# Patient Record
Sex: FEMALE | Race: WHITE | NOT HISPANIC OR LATINO
[De-identification: names, ages, dates, MRNs, and addresses within clinical notes are randomized per-mention and may not be internally consistent; named-entity substitution may affect disease eponyms.]

---

## 2017-05-22 ENCOUNTER — APPOINTMENT (OUTPATIENT)
Dept: GASTROENTEROLOGY | Facility: CLINIC | Age: 42
End: 2017-05-22

## 2017-05-22 VITALS
WEIGHT: 182 LBS | HEIGHT: 61 IN | SYSTOLIC BLOOD PRESSURE: 100 MMHG | DIASTOLIC BLOOD PRESSURE: 80 MMHG | BODY MASS INDEX: 34.36 KG/M2 | TEMPERATURE: 97.8 F

## 2017-05-22 DIAGNOSIS — Z87.891 PERSONAL HISTORY OF NICOTINE DEPENDENCE: ICD-10-CM

## 2017-05-22 DIAGNOSIS — K62.5 HEMORRHAGE OF ANUS AND RECTUM: ICD-10-CM

## 2017-05-22 DIAGNOSIS — Z82.49 FAMILY HISTORY OF ISCHEMIC HEART DISEASE AND OTHER DISEASES OF THE CIRCULATORY SYSTEM: ICD-10-CM

## 2017-05-22 DIAGNOSIS — Z23 ENCOUNTER FOR IMMUNIZATION: ICD-10-CM

## 2017-05-22 DIAGNOSIS — R63.5 ABNORMAL WEIGHT GAIN: ICD-10-CM

## 2017-05-22 DIAGNOSIS — F15.90 OTHER STIMULANT USE, UNSPECIFIED, UNCOMPLICATED: ICD-10-CM

## 2017-05-22 DIAGNOSIS — Z87.19 PERSONAL HISTORY OF OTHER DISEASES OF THE DIGESTIVE SYSTEM: ICD-10-CM

## 2017-05-22 PROBLEM — Z00.00 ENCOUNTER FOR PREVENTIVE HEALTH EXAMINATION: Status: ACTIVE | Noted: 2017-05-22

## 2017-05-22 RX ORDER — ASCORBIC ACID 500 MG
TABLET ORAL
Refills: 0 | Status: ACTIVE | COMMUNITY

## 2017-06-13 ENCOUNTER — RESULT REVIEW (OUTPATIENT)
Age: 42
End: 2017-06-13

## 2017-06-13 ENCOUNTER — OUTPATIENT (OUTPATIENT)
Dept: OUTPATIENT SERVICES | Facility: HOSPITAL | Age: 42
LOS: 1 days | Discharge: HOME | End: 2017-06-13
Payer: MEDICAID

## 2017-06-13 ENCOUNTER — APPOINTMENT (OUTPATIENT)
Dept: GASTROENTEROLOGY | Facility: HOSPITAL | Age: 42
End: 2017-06-13

## 2017-06-13 VITALS
HEART RATE: 50 BPM | OXYGEN SATURATION: 100 % | SYSTOLIC BLOOD PRESSURE: 109 MMHG | RESPIRATION RATE: 14 BRPM | DIASTOLIC BLOOD PRESSURE: 60 MMHG

## 2017-06-13 VITALS
OXYGEN SATURATION: 99 % | TEMPERATURE: 98 F | HEIGHT: 61 IN | DIASTOLIC BLOOD PRESSURE: 54 MMHG | HEART RATE: 58 BPM | WEIGHT: 184.97 LBS | RESPIRATION RATE: 16 BRPM | SYSTOLIC BLOOD PRESSURE: 104 MMHG

## 2017-06-13 DIAGNOSIS — K58.9 IRRITABLE BOWEL SYNDROME WITHOUT DIARRHEA: ICD-10-CM

## 2017-06-13 DIAGNOSIS — K21.9 GASTRO-ESOPHAGEAL REFLUX DISEASE WITHOUT ESOPHAGITIS: ICD-10-CM

## 2017-06-13 DIAGNOSIS — K59.00 CONSTIPATION, UNSPECIFIED: ICD-10-CM

## 2017-06-13 LAB — HCG UR QL: NEGATIVE — SIGNIFICANT CHANGE UP

## 2017-06-13 PROCEDURE — 88305 TISSUE EXAM BY PATHOLOGIST: CPT | Mod: 26

## 2017-06-13 PROCEDURE — 88312 SPECIAL STAINS GROUP 1: CPT | Mod: 26

## 2017-06-13 RX ORDER — SODIUM CHLORIDE 9 MG/ML
1000 INJECTION, SOLUTION INTRAVENOUS
Qty: 0 | Refills: 0 | Status: DISCONTINUED | OUTPATIENT
Start: 2017-06-13 | End: 2017-06-13

## 2017-06-13 NOTE — ASU DISCHARGE PLAN (ADULT/PEDIATRIC). - NOTIFY
Bleeding that does not stop/Persistent Nausea and Vomiting/Inability to Tolerate Liquids or Foods/Fever greater than 101

## 2017-06-15 LAB — SURGICAL PATHOLOGY FINAL REPORT - CH: SIGNIFICANT CHANGE UP

## 2017-06-16 DIAGNOSIS — B96.81 HELICOBACTER PYLORI [H. PYLORI] AS THE CAUSE OF DISEASES CLASSIFIED ELSEWHERE: ICD-10-CM

## 2017-06-16 DIAGNOSIS — K29.50 UNSPECIFIED CHRONIC GASTRITIS WITHOUT BLEEDING: ICD-10-CM

## 2017-06-16 DIAGNOSIS — R10.9 UNSPECIFIED ABDOMINAL PAIN: ICD-10-CM

## 2017-06-16 DIAGNOSIS — K64.9 UNSPECIFIED HEMORRHOIDS: ICD-10-CM

## 2017-06-18 ENCOUNTER — MOBILE ON CALL (OUTPATIENT)
Age: 42
End: 2017-06-18

## 2017-06-20 ENCOUNTER — APPOINTMENT (OUTPATIENT)
Dept: GASTROENTEROLOGY | Facility: CLINIC | Age: 42
End: 2017-06-20

## 2017-06-29 ENCOUNTER — APPOINTMENT (OUTPATIENT)
Dept: GASTROENTEROLOGY | Facility: CLINIC | Age: 42
End: 2017-06-29

## 2017-06-29 VITALS
DIASTOLIC BLOOD PRESSURE: 60 MMHG | SYSTOLIC BLOOD PRESSURE: 100 MMHG | TEMPERATURE: 97.9 F | WEIGHT: 181 LBS | HEIGHT: 61 IN | BODY MASS INDEX: 34.17 KG/M2

## 2017-06-29 DIAGNOSIS — Z71.89 OTHER SPECIFIED COUNSELING: ICD-10-CM

## 2017-06-29 DIAGNOSIS — R14.0 ABDOMINAL DISTENSION (GASEOUS): ICD-10-CM

## 2017-06-29 DIAGNOSIS — E66.9 OBESITY, UNSPECIFIED: ICD-10-CM

## 2017-06-29 DIAGNOSIS — R10.13 EPIGASTRIC PAIN: ICD-10-CM

## 2017-06-29 RX ORDER — SODIUM SULFATE, POTASSIUM SULFATE, MAGNESIUM SULFATE 17.5; 3.13; 1.6 G/ML; G/ML; G/ML
17.5-3.13-1.6 SOLUTION, CONCENTRATE ORAL
Qty: 1 | Refills: 0 | Status: DISCONTINUED | COMMUNITY
Start: 2017-06-29 | End: 2017-06-29

## 2017-07-07 ENCOUNTER — OUTPATIENT (OUTPATIENT)
Dept: OUTPATIENT SERVICES | Facility: HOSPITAL | Age: 42
LOS: 1 days | Discharge: HOME | End: 2017-07-07

## 2017-07-07 ENCOUNTER — APPOINTMENT (OUTPATIENT)
Dept: GASTROENTEROLOGY | Facility: HOSPITAL | Age: 42
End: 2017-07-07

## 2017-07-07 VITALS
HEART RATE: 54 BPM | DIASTOLIC BLOOD PRESSURE: 56 MMHG | HEIGHT: 61 IN | OXYGEN SATURATION: 97 % | SYSTOLIC BLOOD PRESSURE: 116 MMHG | RESPIRATION RATE: 16 BRPM | TEMPERATURE: 98 F | WEIGHT: 182.1 LBS

## 2017-07-07 VITALS
DIASTOLIC BLOOD PRESSURE: 63 MMHG | HEART RATE: 85 BPM | OXYGEN SATURATION: 99 % | RESPIRATION RATE: 13 BRPM | SYSTOLIC BLOOD PRESSURE: 93 MMHG

## 2017-07-07 DIAGNOSIS — Z98.891 HISTORY OF UTERINE SCAR FROM PREVIOUS SURGERY: Chronic | ICD-10-CM

## 2017-07-07 LAB — HCG UR QL: NEGATIVE — SIGNIFICANT CHANGE UP

## 2017-07-07 RX ORDER — SODIUM CHLORIDE 9 MG/ML
1000 INJECTION, SOLUTION INTRAVENOUS
Qty: 0 | Refills: 0 | Status: DISCONTINUED | OUTPATIENT
Start: 2017-07-07 | End: 2017-07-07

## 2017-07-07 RX ORDER — OMEPRAZOLE 10 MG/1
1 CAPSULE, DELAYED RELEASE ORAL
Qty: 0 | Refills: 0 | COMMUNITY

## 2017-07-07 RX ORDER — MORPHINE SULFATE 50 MG/1
4 CAPSULE, EXTENDED RELEASE ORAL
Qty: 0 | Refills: 0 | Status: DISCONTINUED | OUTPATIENT
Start: 2017-07-07 | End: 2017-07-22

## 2017-07-07 RX ADMIN — Medication 1 MILLIGRAM(S): at 10:30

## 2017-07-07 RX ADMIN — SODIUM CHLORIDE 75 MILLILITER(S): 9 INJECTION, SOLUTION INTRAVENOUS at 11:00

## 2017-07-10 ENCOUNTER — APPOINTMENT (OUTPATIENT)
Dept: GASTROENTEROLOGY | Facility: CLINIC | Age: 42
End: 2017-07-10

## 2017-07-10 VITALS
HEIGHT: 61 IN | TEMPERATURE: 97.8 F | BODY MASS INDEX: 34.74 KG/M2 | DIASTOLIC BLOOD PRESSURE: 65 MMHG | WEIGHT: 184 LBS | SYSTOLIC BLOOD PRESSURE: 100 MMHG

## 2017-07-10 DIAGNOSIS — K59.00 CONSTIPATION, UNSPECIFIED: ICD-10-CM

## 2017-07-10 DIAGNOSIS — K21.9 GASTRO-ESOPHAGEAL REFLUX DISEASE W/OUT ESOPHAGITIS: ICD-10-CM

## 2017-07-10 DIAGNOSIS — A04.8 OTHER SPECIFIED BACTERIAL INTESTINAL INFECTIONS: ICD-10-CM

## 2017-07-10 DIAGNOSIS — K58.9 IRRITABLE BOWEL SYNDROME W/OUT DIARRHEA: ICD-10-CM

## 2017-07-10 RX ORDER — LINACLOTIDE 145 UG/1
145 CAPSULE, GELATIN COATED ORAL
Qty: 30 | Refills: 3 | Status: ACTIVE | COMMUNITY
Start: 2017-07-10 | End: 1900-01-01

## 2017-07-10 RX ORDER — OMEPRAZOLE 20 MG/1
20 CAPSULE, DELAYED RELEASE ORAL
Qty: 20 | Refills: 1 | Status: ACTIVE | COMMUNITY
Start: 2017-05-22 | End: 1900-01-01

## 2017-07-10 RX ORDER — AMOXICILLIN 500 MG/1
500 CAPSULE ORAL TWICE DAILY
Qty: 40 | Refills: 0 | Status: ACTIVE | COMMUNITY
Start: 2017-07-10 | End: 1900-01-01

## 2017-07-10 RX ORDER — CLARITHROMYCIN 500 MG/1
500 TABLET, FILM COATED ORAL TWICE DAILY
Qty: 20 | Refills: 1 | Status: ACTIVE | COMMUNITY
Start: 2017-07-10 | End: 1900-01-01

## 2017-07-11 DIAGNOSIS — R19.4 CHANGE IN BOWEL HABIT: ICD-10-CM

## 2017-07-11 DIAGNOSIS — K64.9 UNSPECIFIED HEMORRHOIDS: ICD-10-CM

## 2017-09-11 ENCOUNTER — APPOINTMENT (OUTPATIENT)
Dept: GASTROENTEROLOGY | Facility: CLINIC | Age: 42
End: 2017-09-11

## 2017-09-14 ENCOUNTER — APPOINTMENT (OUTPATIENT)
Dept: GASTROENTEROLOGY | Facility: CLINIC | Age: 42
End: 2017-09-14

## 2017-09-25 ENCOUNTER — APPOINTMENT (OUTPATIENT)
Dept: GASTROENTEROLOGY | Facility: CLINIC | Age: 42
End: 2017-09-25
Payer: MEDICAID

## 2017-09-25 PROCEDURE — 83014 H PYLORI DRUG ADMIN: CPT

## 2024-12-05 ENCOUNTER — INPATIENT (INPATIENT)
Facility: HOSPITAL | Age: 49
LOS: 0 days | Discharge: HOME | End: 2024-12-06
Attending: GENERAL PRACTICE | Admitting: GENERAL PRACTICE
Payer: MEDICAID

## 2024-12-05 VITALS
HEART RATE: 55 BPM | RESPIRATION RATE: 17 BRPM | SYSTOLIC BLOOD PRESSURE: 99 MMHG | TEMPERATURE: 98 F | OXYGEN SATURATION: 100 % | DIASTOLIC BLOOD PRESSURE: 65 MMHG

## 2024-12-05 DIAGNOSIS — Z29.9 ENCOUNTER FOR PROPHYLACTIC MEASURES, UNSPECIFIED: ICD-10-CM

## 2024-12-05 DIAGNOSIS — I25.10 ATHEROSCLEROTIC HEART DISEASE OF NATIVE CORONARY ARTERY WITHOUT ANGINA PECTORIS: ICD-10-CM

## 2024-12-05 DIAGNOSIS — Z98.891 HISTORY OF UTERINE SCAR FROM PREVIOUS SURGERY: Chronic | ICD-10-CM

## 2024-12-05 DIAGNOSIS — E78.5 HYPERLIPIDEMIA, UNSPECIFIED: ICD-10-CM

## 2024-12-05 DIAGNOSIS — R07.9 CHEST PAIN, UNSPECIFIED: ICD-10-CM

## 2024-12-05 PROBLEM — K59.00 CONSTIPATION, UNSPECIFIED: Chronic | Status: ACTIVE | Noted: 2017-06-13

## 2024-12-05 PROBLEM — K21.9 GASTRO-ESOPHAGEAL REFLUX DISEASE WITHOUT ESOPHAGITIS: Chronic | Status: ACTIVE | Noted: 2017-07-07

## 2024-12-05 LAB
ALBUMIN SERPL ELPH-MCNC: 4.1 G/DL — SIGNIFICANT CHANGE UP (ref 3.3–5)
ALP SERPL-CCNC: 50 U/L — SIGNIFICANT CHANGE UP (ref 40–120)
ALT FLD-CCNC: 10 U/L — SIGNIFICANT CHANGE UP (ref 4–33)
ANION GAP SERPL CALC-SCNC: 10 MMOL/L — SIGNIFICANT CHANGE UP (ref 7–14)
AST SERPL-CCNC: 14 U/L — SIGNIFICANT CHANGE UP (ref 4–32)
BASOPHILS # BLD AUTO: 0.03 K/UL — SIGNIFICANT CHANGE UP (ref 0–0.2)
BASOPHILS NFR BLD AUTO: 0.4 % — SIGNIFICANT CHANGE UP (ref 0–2)
BILIRUB SERPL-MCNC: 0.3 MG/DL — SIGNIFICANT CHANGE UP (ref 0.2–1.2)
BUN SERPL-MCNC: 12 MG/DL — SIGNIFICANT CHANGE UP (ref 7–23)
CALCIUM SERPL-MCNC: 9 MG/DL — SIGNIFICANT CHANGE UP (ref 8.4–10.5)
CHLORIDE SERPL-SCNC: 106 MMOL/L — SIGNIFICANT CHANGE UP (ref 98–107)
CO2 SERPL-SCNC: 25 MMOL/L — SIGNIFICANT CHANGE UP (ref 22–31)
CREAT SERPL-MCNC: 0.71 MG/DL — SIGNIFICANT CHANGE UP (ref 0.5–1.3)
CRP SERPL-MCNC: <3 MG/L — SIGNIFICANT CHANGE UP
D DIMER BLD IA.RAPID-MCNC: <150 NG/ML DDU — SIGNIFICANT CHANGE UP
EGFR: 104 ML/MIN/1.73M2 — SIGNIFICANT CHANGE UP
EOSINOPHIL # BLD AUTO: 0.07 K/UL — SIGNIFICANT CHANGE UP (ref 0–0.5)
EOSINOPHIL NFR BLD AUTO: 0.9 % — SIGNIFICANT CHANGE UP (ref 0–6)
ERYTHROCYTE [SEDIMENTATION RATE] IN BLOOD: 6 MM/HR — SIGNIFICANT CHANGE UP (ref 4–25)
FLUAV AG NPH QL: SIGNIFICANT CHANGE UP
FLUBV AG NPH QL: SIGNIFICANT CHANGE UP
GLUCOSE SERPL-MCNC: 84 MG/DL — SIGNIFICANT CHANGE UP (ref 70–99)
HCT VFR BLD CALC: 38.7 % — SIGNIFICANT CHANGE UP (ref 34.5–45)
HGB BLD-MCNC: 13 G/DL — SIGNIFICANT CHANGE UP (ref 11.5–15.5)
IANC: 4.23 K/UL — SIGNIFICANT CHANGE UP (ref 1.8–7.4)
IMM GRANULOCYTES NFR BLD AUTO: 0.3 % — SIGNIFICANT CHANGE UP (ref 0–0.9)
LIDOCAIN IGE QN: 24 U/L — SIGNIFICANT CHANGE UP (ref 7–60)
LYMPHOCYTES # BLD AUTO: 2.75 K/UL — SIGNIFICANT CHANGE UP (ref 1–3.3)
LYMPHOCYTES # BLD AUTO: 37 % — SIGNIFICANT CHANGE UP (ref 13–44)
MAGNESIUM SERPL-MCNC: 1.9 MG/DL — SIGNIFICANT CHANGE UP (ref 1.6–2.6)
MCHC RBC-ENTMCNC: 30.4 PG — SIGNIFICANT CHANGE UP (ref 27–34)
MCHC RBC-ENTMCNC: 33.6 G/DL — SIGNIFICANT CHANGE UP (ref 32–36)
MCV RBC AUTO: 90.4 FL — SIGNIFICANT CHANGE UP (ref 80–100)
MONOCYTES # BLD AUTO: 0.33 K/UL — SIGNIFICANT CHANGE UP (ref 0–0.9)
MONOCYTES NFR BLD AUTO: 4.4 % — SIGNIFICANT CHANGE UP (ref 2–14)
NEUTROPHILS # BLD AUTO: 4.23 K/UL — SIGNIFICANT CHANGE UP (ref 1.8–7.4)
NEUTROPHILS NFR BLD AUTO: 57 % — SIGNIFICANT CHANGE UP (ref 43–77)
NRBC # BLD: 0 /100 WBCS — SIGNIFICANT CHANGE UP (ref 0–0)
NRBC # FLD: 0 K/UL — SIGNIFICANT CHANGE UP (ref 0–0)
NT-PROBNP SERPL-SCNC: 44 PG/ML — SIGNIFICANT CHANGE UP
PLATELET # BLD AUTO: 254 K/UL — SIGNIFICANT CHANGE UP (ref 150–400)
POTASSIUM SERPL-MCNC: 4.3 MMOL/L — SIGNIFICANT CHANGE UP (ref 3.5–5.3)
POTASSIUM SERPL-SCNC: 4.3 MMOL/L — SIGNIFICANT CHANGE UP (ref 3.5–5.3)
PROT SERPL-MCNC: 6.5 G/DL — SIGNIFICANT CHANGE UP (ref 6–8.3)
RBC # BLD: 4.28 M/UL — SIGNIFICANT CHANGE UP (ref 3.8–5.2)
RBC # FLD: 12.8 % — SIGNIFICANT CHANGE UP (ref 10.3–14.5)
RSV RNA NPH QL NAA+NON-PROBE: SIGNIFICANT CHANGE UP
SARS-COV-2 RNA SPEC QL NAA+PROBE: SIGNIFICANT CHANGE UP
SODIUM SERPL-SCNC: 141 MMOL/L — SIGNIFICANT CHANGE UP (ref 135–145)
TROPONIN T, HIGH SENSITIVITY RESULT: <6 NG/L — SIGNIFICANT CHANGE UP
WBC # BLD: 7.43 K/UL — SIGNIFICANT CHANGE UP (ref 3.8–10.5)
WBC # FLD AUTO: 7.43 K/UL — SIGNIFICANT CHANGE UP (ref 3.8–10.5)

## 2024-12-05 PROCEDURE — 71045 X-RAY EXAM CHEST 1 VIEW: CPT | Mod: 26

## 2024-12-05 PROCEDURE — 99285 EMERGENCY DEPT VISIT HI MDM: CPT

## 2024-12-05 PROCEDURE — 99497 ADVNCD CARE PLAN 30 MIN: CPT | Mod: 25

## 2024-12-05 PROCEDURE — 99233 SBSQ HOSP IP/OBS HIGH 50: CPT | Mod: 25

## 2024-12-05 RX ORDER — MAGNESIUM, ALUMINUM HYDROXIDE 200-225/5
30 SUSPENSION, ORAL (FINAL DOSE FORM) ORAL ONCE
Refills: 0 | Status: COMPLETED | OUTPATIENT
Start: 2024-12-05 | End: 2024-12-05

## 2024-12-05 RX ORDER — ACETAMINOPHEN 500MG 500 MG/1
650 TABLET, COATED ORAL EVERY 6 HOURS
Refills: 0 | Status: DISCONTINUED | OUTPATIENT
Start: 2024-12-05 | End: 2024-12-06

## 2024-12-05 RX ORDER — ENOXAPARIN SODIUM 30 MG/.3ML
40 INJECTION SUBCUTANEOUS EVERY 24 HOURS
Refills: 0 | Status: DISCONTINUED | OUTPATIENT
Start: 2024-12-05 | End: 2024-12-06

## 2024-12-05 RX ORDER — CHOLECALCIFEROL (VITAMIN D3) 10MCG/0.25
2000 DROPS ORAL
Refills: 0 | DISCHARGE

## 2024-12-05 RX ADMIN — Medication 20 MILLIGRAM(S): at 23:52

## 2024-12-05 RX ADMIN — Medication 324 MILLIGRAM(S): at 19:30

## 2024-12-05 NOTE — ED PROVIDER NOTE - CLINICAL SUMMARY MEDICAL DECISION MAKING FREE TEXT BOX
History:  - 49 years old f pmhx GERD, CAD, p/w episode of Chest tightness, dizziness, shortness of breath starting 1 hour prior to arrival, Recent viral illness with ear infection 2 weeks ago affecting chest. Similar episode 2 days ago  Associated symptoms: coughing, nausea No vomiting, fevers. Recent stress test completed, cardiologist Dr. Freire recommended coronary CT (declined by insurance)    Past Medical History:  - CAD  - GERD  - Constipation  - Previous   - Last seen at [facility] in 2017    Medications:  - Vitamin D  - Vitamin B12  - Omeprazole (as of 2017)    Physical Examination:  - BP: 99/65  - HR: 65  - RR: 18  - Temp: afebrile  - O2: 100% on room air  - Heart: normal, no murmurs, rubs, or gallops no friction rub  - Lungs: clear to auscultation bilaterally  - Abdomen: soft, nontender  - Extremities: no peripheral oedema  - Skin: warm, well perfused    Impression:  - Acute coronary symptoms for investigation vs pericarditis vs coronary spasm    Management Plan:  - Blood work including esr/crp given recent infection pericarditis will get bedside US eval for pericardial effusion although did not hear friction rub, vs coronary spasm  - Chest X-ray  - CDU for coronary CT  - Contact cardiologist (Dr Nate Freire)

## 2024-12-05 NOTE — H&P ADULT - NSICDXPASTMEDICALHX_GEN_ALL_CORE_FT
PAST MEDICAL HISTORY:  CAD (coronary artery disease)     Constipation     Gastroesophageal reflux disease

## 2024-12-05 NOTE — H&P ADULT - NSHPPHYSICALEXAM_GEN_ALL_CORE
- GENERAL: Alert and oriented x 3. No acute distress. Well-nourished.  - EYES: EOMI. Anicteric.  - HENT: Moist mucous membranes. No scleral icterus. No cervical lymphadenopathy.  - LUNGS: Clear to auscultation bilaterally. No accessory muscle use.  - CARDIOVASCULAR: Regular rate and rhythm. No murmur. No JVD. TTP of the chest wall  - ABDOMEN: Soft, non-tender and non-distended. No palpable masses.  - EXTREMITIES: No edema. Non-tender.  - SKIN: No rashes or lesions. Warm.  - NEUROLOGIC: No focal neurological deficits. CN II-XII grossly intact, but not individually tested.  - PSYCHIATRIC: Cooperative. Appropriate mood and affect.

## 2024-12-05 NOTE — H&P ADULT - TIME BILLING
Preparing to see the patient including review of tests and other providers' notes, confirming history with patient, performing medical examination and evaluation, counseling and educating the patient, ordering medications, tests and procedures, communicating with other health care professionals, documenting clinical information in the EMR, independently interpreting results and communicating results to the patient/family/caregiver, care coordination

## 2024-12-05 NOTE — ED PROVIDER NOTE - PROGRESS NOTE DETAILS
spoke with dr mike, since no possibility of ct coronory until next wed, jacqueline dmit to dr martinez for cath

## 2024-12-05 NOTE — PATIENT PROFILE ADULT - NSFALLSECTIONLABEL_GEN_A_CORE
WORKERS' COMPENSATION FOLLOW-UP NOTE    EMPLOYER: [employer]    DATE OF INJURY: 2017    CHIEF COMPLAINT:    Chief Complaint   Patient presents with   • Worker's Compensation     WRK 17 BACK L [employer]       HISTORY OF INJURY:   Nidia Ortiz is a 43 year old female, who returns for follow up of a work injury to her  BACK  that occurred at work on 2017.      Compared to the worst this pain has been since the time of initial injury, patient reports currently feeling ***% better.    Current medications were reviewed.  Medications relevant to this injury as actually taken at this time by patient, (including dose, frequency) are: ***.    Currently employee states she is {RES WORKIN}.    Is your employer following the restrictions you were given?  {Yes_No_---:114448}    Therapy:  {RES ATTENDANCE:090885}.    The specific location of pain or other symptoms  ***    REVIEW OF SYSTEMS: Relevant findings are included in the History of Injury.    PHYSICAL EXAM:   There were no vitals filed for this visit.  ***     ASSESSMENT:  No diagnosis found.    Work Relatedness:  Based on patient's history and my evaluation, this injury/illness is {OSW Work Related:784365} by causation or by precipitation or aggravation.      PLAN:    Treatment/Medication changes:  Continue present management ***.    During a *** minute visit, more than half of the visit was spent counseling the patient.       .

## 2024-12-05 NOTE — H&P ADULT - PROBLEM SELECTOR PLAN 2
-Follows with Dr. Freire; was recommended obtaining coronary CTA but has not been able to get it due to insurance concerns  -Ordered Coronary CTA (Obtain inpatient if able vs follow up outpatient)

## 2024-12-05 NOTE — H&P ADULT - PROBLEM SELECTOR PLAN 4
VTE PPx: Lovenox  Nutrition: DASH/TLC Diet  Fluids: None  Electrolytes: Maintain K>4, Mag >2, Phos > 3  Access: PIV  Code Status: FULL  Dispo: pending clinical improvement

## 2024-12-05 NOTE — ED ADULT NURSE NOTE - NSFALLUNIVINTERV_ED_ALL_ED
Bed/Stretcher in lowest position, wheels locked, appropriate side rails in place/Call bell, personal items and telephone in reach/Instruct patient to call for assistance before getting out of bed/chair/stretcher/Non-slip footwear applied when patient is off stretcher/[place] to call system/Physically safe environment - no spills, clutter or unnecessary equipment/Purposeful proactive rounding/Room/bathroom lighting operational, light cord in reach

## 2024-12-05 NOTE — ED ADULT TRIAGE NOTE - CHIEF COMPLAINT QUOTE
Pt c/o cp starting 1 hour ago. Pt was seen at urgent care and sent to ED for further evaluation. Phx " Valve blockage"

## 2024-12-05 NOTE — ED PROVIDER NOTE - PHYSICAL EXAMINATION
GENERAL: well appearing in no acute distress, non-toxic appearing  CARDIAC: regular rate and rhythm, normal S1S2, no appreciable murmurs, 2+ pulses in UE/LE b/l  PULM: normal breath sounds, clear to ascultation bilaterally, no rales, rhonchi, wheezing  GI: abdomen nondistended, soft, nontender, no guarding, rebound tenderness  NEURO: no focal motor or sensory deficits, CN2-12 intact, normal speech, PERRLA, EOMI, normal gait, AAOx3  MSK: no peripheral edema, no calf tenderness b/l

## 2024-12-05 NOTE — ED ADULT NURSE NOTE - OBJECTIVE STATEMENT
pt received to 1A, A&Ox4, ambulatory, hx of "valve blockage", coming to ED c/o intermittent left sided chest pressure with SOB x1 month. Pt states these episodes last 20 minutes and resolve by themselves. last episode 12:30 pm today. Pt denies chest pain, SOB, abdominal pain, N/V, headache, dizziness, blurry vision, fever or chills at this time. RR even and unlabored, spo2 100% on room air. Placed on cardiac monitor, noted to be NSR. EKG complete. +2 radial pulses bilaterally, equal in strength rate and rhythm. Abdomen soft non-tender, nondistended. No neuro deficits noted. Skin clean dry and intact. 20 G IV placed to left AC. labs drawn and sent. care plan continued. safety maintained.

## 2024-12-05 NOTE — H&P ADULT - HISTORY OF PRESENT ILLNESS
49 year old female with PMHx of CAD, HLD and GERD who presents to the ED with chest tightness. It is located in the substernal region, not provoked by exertion, and not relieved by rest or nitroglycerin. It is characterized as "tightness" and is non-radiating. Denies any associated symptoms including SOB, fatigue, distress, diaphoresis, nausea, vomiting, abdominal pain, or leg swelling. Patient recently had a URI about 2 weeks ago and had coughing and nausea at that time. Patient denies immobility, long plane/car rides, malignancy, or history of clotting disorder. No family history of CVD before the age of 65. Patient currently smokes 4-5 cigarettes per day. Patient follows with cardiology (Dr. Freire) and had a normal stress test recently. Patient was planning on obtaining coronary CTA but has not been able to get it due to insurance concerns. No prior history of MI/PCI/CABG/CVA/TIA/PAD/AAA. No hx of hypothyroidism or HIV infection or Hepatitis C or autoimmune disorders. No psychosocial factors including stress/anxiety/depression.

## 2024-12-05 NOTE — H&P ADULT - PROBLEM SELECTOR PLAN 1
::Nonspecific chest pain without troponinemia; ECG with no ischemic changes; Hemodynamically stable. No evidence of volume overload or shock on exam. Low suspicion ACS given positional, and reproducible pain by palpation. Low suspicion for PE (Wells score 0), PTX, aortic dissection, PNA, cardiac effusion or tamponade. CP likely 2/2 costochondritis. Other differentials include GERD.   -HEART score: 3 ;CAD risk factors include Age 49; HLD, Smoking  -Troponin: <6  -CXR: no acute process  -CBC, CMP unremarkable  -pro-BNP 44  -Pre-test probability of CAD 3% per CAD consortium  -Admit to Telemetry; cardiac monitor  -ASA & Statin   -Repeat ECG in the AM  -TSH, A1c, LDL to risk stratify or assess for CV risk factors   -Avoid NSAIDs except for ASA to limit risk of cardiovascular events  -F/u pending labs and images

## 2024-12-05 NOTE — ED ADULT TRIAGE NOTE - NS ED TRIAGE AVPU SCALE
Detail Level: Detailed Size Of Lesion: 2-4mm Body Location Override (Optional - Billing Will Still Be Based On Selected Body Map Location If Applicable): right upper posterior leg Size Of Lesion: 6mm Alert-The patient is alert, awake and responds to voice. The patient is oriented to time, place, and person. The triage nurse is able to obtain subjective information.

## 2024-12-05 NOTE — H&P ADULT - NSHPREVIEWOFSYSTEMS_GEN_ALL_CORE
- CONSTITUTIONAL: Denies weight loss, fever and chills.  - HEENT: Denies changes in vision and hearing.  - RESPIRATORY: Denies SOB and cough.  - CV: Denies palpitations. +CP  - GI: Denies abdominal pain, nausea, vomiting and diarrhea.  - : Denies dysuria and urinary frequency.  - MSK: Denies myalgia and joint pain.  - SKIN: Denies rash and pruritus.  - NEUROLOGICAL: Denies headache and syncope.  - PSYCHIATRIC: Denies recent changes in mood. Denies anxiety and depression.    A 12-point review of systems was completed and is otherwise negative except as noted in the HPI.

## 2024-12-05 NOTE — PATIENT PROFILE ADULT - FALL HARM RISK - HARM RISK INTERVENTIONS
Communicate Risk of Fall with Harm to all staff/Monitor for mental status changes/Monitor gait and stability/Reinforce activity limits and safety measures with patient and family/Reorient to person, place and time as needed/Review medications for side effects contributing to fall risk/Sit up slowly, dangle for a short time, stand at bedside before walking/Tailored Fall Risk Interventions/Toileting schedule using arm’s reach rule for commode and bathroom/Use of alarms - bed, chair and/or voice tab/Visual Cue: Yellow wristband and red socks/Bed in lowest position, wheels locked, appropriate side rails in place/Call bell, personal items and telephone in reach/Instruct patient to call for assistance before getting out of bed or chair/Non-slip footwear when patient is out of bed/[place] to call system/Physically safe environment - no spills, clutter or unnecessary equipment/Purposeful Proactive Rounding/Room/bathroom lighting operational, light cord in reach

## 2024-12-05 NOTE — ED PROVIDER NOTE - NS ED MD DISPO DIVISION
RUSSELL Tazorac Counseling:  Patient advised that medication is irritating and drying.  Patient may need to apply sparingly and wash off after an hour before eventually leaving it on overnight.  The patient verbalized understanding of the proper use and possible adverse effects of tazorac.  All of the patient's questions and concerns were addressed.

## 2024-12-05 NOTE — H&P ADULT - ASSESSMENT
49 year old female with PMHx of CAD, GERD, HLD who presents to the ED with chest tightness. Exam notable for TTP of the chest wall. Troponin negative. ECG with sinus bradycardia. Pain likely 2/2 costochondritis. Given cardiac hx patient admitted for observation.

## 2024-12-05 NOTE — ED PROVIDER NOTE - ATTENDING CONTRIBUTION TO CARE
This is a 49-year-old female with a past medical history of GERD, CAD presents to ED complaining of chest pain dizziness shortness of breath that has been intermittent for the past 2 weeks.  She said the symptoms started again about an hour ago and she is currently asymptomatic.  She had a recent viral illness about 2 weeks ago.  Denies any cough, fevers, chills.  She states she had a recent stress test as negative by cardiology and they recommended a CT of the coronaries that she has not had yet.    Physical exam:  Overall well-appearing female in no acute distress.  Vital signs are stable.  Heart is regular rate without murmurs or gallops  Lungs are clear to auscultation lung field appears rales or rhonchi  Abdomen soft nontender distended  No lower extremity swelling    MDM  Patient presents to the ED complaining of chest pain shortness of breath and dizziness.  She states that she is currently symptomatic however.  Vital signs within acceptable range.    My interpretation of the chest x-ray shows sinus bradycardia 50 bpm, normal axis, normal intervals, no C7 elevation or depression.  No STEMI.    My review of patient's lower shows no anemia or leukocytosis.  D-dimer negative low concern for PE.  Normal renal function.  Normal electrolytes.  Initial troponin is less than 6.    My independent interpretation of the chest x-ray shows no infiltrate, normal cardiac silhouette.    Plan to admit the patient to observation.  Patient signed out to Dr. Dougherty

## 2024-12-06 ENCOUNTER — TRANSCRIPTION ENCOUNTER (OUTPATIENT)
Age: 49
End: 2024-12-06

## 2024-12-06 VITALS
DIASTOLIC BLOOD PRESSURE: 51 MMHG | HEART RATE: 63 BPM | OXYGEN SATURATION: 97 % | RESPIRATION RATE: 18 BRPM | SYSTOLIC BLOOD PRESSURE: 103 MMHG | TEMPERATURE: 97 F

## 2024-12-06 LAB — HCG UR QL: NEGATIVE — SIGNIFICANT CHANGE UP

## 2024-12-06 PROCEDURE — 93454 CORONARY ARTERY ANGIO S&I: CPT | Mod: 26

## 2024-12-06 RX ORDER — INFLUENZA VIRUS VACCINE 15; 15; 15; 15 UG/.5ML; UG/.5ML; UG/.5ML; UG/.5ML
0.5 SUSPENSION INTRAMUSCULAR ONCE
Refills: 0 | Status: DISCONTINUED | OUTPATIENT
Start: 2024-12-06 | End: 2024-12-06

## 2024-12-06 RX ADMIN — Medication 81 MILLIGRAM(S): at 14:59

## 2024-12-06 RX ADMIN — ENOXAPARIN SODIUM 40 MILLIGRAM(S): 30 INJECTION SUBCUTANEOUS at 15:16

## 2024-12-06 NOTE — DISCHARGE NOTE PROVIDER - CARE PROVIDER_API CALL
Nate Ulloa  Cardiovascular Disease  [address] [place] [id number]  Phone: [phone]  Fax: [phone]  Established Patient  Follow Up Time:

## 2024-12-06 NOTE — DISCHARGE NOTE NURSING/CASE MANAGEMENT/SOCIAL WORK - NSDCPEFALRISK_GEN_ALL_CORE
For information on Fall & Injury Prevention, visit: [email or web address] OR  [email or web address] OR  https://www.cdc.gov/steadi/patient.html

## 2024-12-06 NOTE — DISCHARGE NOTE PROVIDER - NSDCCPCAREPLAN_GEN_ALL_CORE_FT
PRINCIPAL DISCHARGE DIAGNOSIS  Diagnosis: Chest pain  Assessment and Plan of Treatment: Follow up with Dr Ulloa in one week- cardiologist

## 2024-12-06 NOTE — ASU PATIENT PROFILE, ADULT - BLOOD TRANSFUSION, PREVIOUS, PROFILE
"The following statement was read to the patient at the outset of this visit:     \"We have found that certain health care needs can be provided without the need for a face to face visit.  This service lets us provide the care you need with a phone conversation. I will have full access to your [facility] medical record during this entire phone call.   I will be taking notes for your medical record.  Since this is like an office visit, we will bill your insurance company for this service. There are potential benefits and risks of telephone visits (e.g. limits to patient confidentiality) that differ from in-person visits.?  Confidentiality still applies for telephone services, and nobody will record the visit.  It is important to be in a quiet, private space that is free of distractions (including cell phone or other devices) during the visit.??If during the course of the call I believe a telephone visit is not appropriate, you will not be charged for this service.\"     Consent has been obtained for this service by care team member: Yes     Emergency contact: Janice Hillman [phone]  [facility] Emergency Room: [place]  Location at time of call: home    Behavioral Health Progress Note    Client's Legal Name: Clarke Moreira    Client's Preferred Name: Clarke  YOB: 1951  Type of Service: telephone, counseling  Length of Service:   Start time: 10:03  End time: 10:41  Duration: 38 minutes  Attendees: patient    Identifying Information and Presenting Problem:    The patient is a 71 year old American male who I have seen off and on for therapy over the past couple of years.  Clarke did have difficulties at times coming into clinic for appointments as the anxiety would often lead him to cancel appointments.  Since the pandemic started and we have been doing phone visits, Clarke reports feeling much more comfortable in all of his appointments when they are done via telephone than when he is in " person.  In person appointments with all providers have always caused significant distress due to his desire to be perceived in a particular way.  On the telephone, he feels as if he can better articulate his experience and what he is thinking as he feels less need to project a particular image to those that are caring for him.  Anxiety has kept Clarke from seeking medical care that he needs when he notices exacerbations of conditions.  We have been focused on increasing skills to cope with anxiety so that he can stay appropriately engaged in needed healthcare, as well as continuing to challenge very embedded negative core beliefs about himself.     Treatment Objective(s) Addressed in This Session:    Clarke will practice a healthy daily discipline of diet and exercise.  Clarke will learn to manage anxiety so he can make and keep appointments     Progress on / Status of Treatment Objective(s) / Homework:  stable    PHQ-9:   PHQ 11/9/2020 1/6/2020 9/3/2019   PHQ-9 Total Score 22 21 25   Q9: Thoughts of better off dead/self-harm past 2 weeks More than half the days Several days Nearly every day   F/U: Thoughts of suicide or self-harm - No -   F/U: Safety concerns - No -      CHRIS-7:   CHRIS-7 SCORE 9/3/2019 5/10/2019 12/11/2018   Total Score - - -   Total Score 19 13 20     Topics Discussed/Interventions Provided:  We had an appt at 8:30 this morning.  When I tried to call the patient at that time, he did not answer.  I became very worried that something healthwise had happened to him, as he always answers the call for our visits or calls to cancel.  Tried to call throughout the morning and decided to try one more time at 10 am. If I did not reach at that time, decided I would call for a welfare check.  When I called at 10 am, Clarke answered.  He was happy to hear from me, but disappointed to hear what happened.  He said he called at 8:09 this morning and called to have someone let me know that he would not be able  "to take the call for our appt as they were being evacuated from the building for a fire alarm that had been pulled. We spent time processing this event.  Clarke shared that he frequently worries that if something should happen to him as a result of his health, he has concerns that no one would check on him.  He shared that in some ways, he was thankful this happened, as now he knows there is someone that would check in.  The remainder of our session focused on the challenges of seeing hope in the world right now.  When overwhelmed by world events, we explored the concept of \"hope as a discipline\"  Focusing in on your sphere of influence around you to look for good aspects about humanity and the things one has control over    Assessment:   The patient appeared to be active and engaged in today's session and was receptive to feedback.     Mental Status:   During our visit today, Clarke was open, pleasant and respectful. He was alert and oriented to person, place, time, and situation.  Unable to assess appearance as this was a phone visit.  Speech was normal rate and rhythm.  Mood was described as a little down, mood-congruent affect.  Thought processes were logical and coherent. Fewer self-deprecating thoughts than usual today.  Has thoughts that he wishes he wouldn't wake up or be here at times, but denies any intent or plan to harm himself.  Memory appeared grossly intact without being formally evaluated. Insight and judgment are good.  Patient exhibited good impulse control during the appointment.     Does the patient appear to be at imminent risk of harm to self/others at this time? No    The session was necessary to continue to challenge unhelpful thoughts that are an aspect of low mood and low self-view. Ongoing depressive symptoms and low self-view continue to interfere with patient's ability to function in areas related to social relationships day to day self care or health behaviors chronic disease " management household management. Ongoing psychotherapy is necessary to stabilize mood, provide counseling, improve functioning with daily activities and provide support.    DSM-5 Diagnosis:  Major Depression, recurrent, moderate  Generalized Anxiety Disorder  R/o persistent depressive disorder    Plan:  - f/up on 6/27  - need to update diagnostic assessment and treatment plan at that time.      Lety Buenrostro PsyD    NOTE: Treatment plan update needed on 3/10/21.  Diagnostic assessment update due 10/15/19.   no

## 2024-12-06 NOTE — CONSULT NOTE ADULT - SUBJECTIVE AND OBJECTIVE BOX
CHIEF COMPLAINT: cp    HISTORY OF PRESENT ILLNESS:  49 year old female with PMHx of CAD, HLD and GERD who presents to the ED with chest tightness. It is located in the substernal region, not provoked by exertion, and not relieved by rest or nitroglycerin. It is characterized as "tightness" and is non-radiating. Denies any associated symptoms including SOB, fatigue, distress, diaphoresis, nausea, vomiting, abdominal pain, or leg swelling. Patient recently had a URI about 2 weeks ago and had coughing and nausea at that time. Patient denies immobility, long plane/car rides, malignancy, or history of clotting disorder. No family history of CVD before the age of 65. Patient currently smokes 4-5 cigarettes per day. Patient follows with cardiology (Dr. Freire) and had a normal stress test recently. Patient was planning on obtaining coronary CTA but has not been able to get it due to insurance concerns. No prior history of MI/PCI/CABG/CVA/TIA/PAD/AAA. No hx of hypothyroidism or HIV infection or Hepatitis C or autoimmune disorders. No psychosocial factors including stress/anxiety/depression.          Allergies    No Known Allergies    Intolerances    	    MEDICATIONS:  aspirin enteric coated 81 milliGRAM(s) Oral daily  enoxaparin Injectable 40 milliGRAM(s) SubCutaneous every 24 hours        acetaminophen     Tablet .. 650 milliGRAM(s) Oral every 6 hours PRN      atorvastatin 20 milliGRAM(s) Oral at bedtime    influenza   Vaccine 0.5 milliLiter(s) IntraMuscular once      PAST MEDICAL & SURGICAL HISTORY:  Constipation      Gastroesophageal reflux disease      CAD (coronary artery disease)      History of  section          FAMILY HISTORY:      SOCIAL HISTORY:    current smoker. indep in adl      REVIEW OF SYSTEMS:  See HPI, otherwise complete 10 point review of systems negative    [ ] All others negative	      PHYSICAL EXAM:  T(C): 36.2 (24 @ 04:46), Max: 36.8 (24 @ 17:10)  HR: 55 (24 @ 04:46) (51 - 61)  BP: 97/55 (24 @ 04:46) (93/55 - 107/57)  RR: 17 (24 @ 04:46) (12 - 18)  SpO2: 99% (24 @ 04:46) (98% - 100%)  Wt(kg): --  I&O's Summary      Appearance: No Acute Distress	  HEENT:  Normal oral mucosa, PERRL, EOMI	  Cardiovascular: Normal S1 S2, No JVD, No murmurs/rubs/gallops  Respiratory: Lungs clear to auscultation bilaterally  Gastrointestinal:  Soft, Non-tender, + BS	  Skin: No rashes, No ecchymoses, No cyanosis	  Neurologic: Non-focal  Extremities: No clubbing, cyanosis or edema  Vascular: Peripheral pulses palpable 2+ bilaterally  Psychiatry: A & O x 3, Mood & affect appropriate    Laboratory Data:	 	    CBC Full  -  ( 05 Dec 2024 15:15 )  WBC Count : 7.43 K/uL  Hemoglobin : 13.0 g/dL  Hematocrit : 38.7 %  Platelet Count - Automated : 254 K/uL  Mean Cell Volume : 90.4 fL  Mean Cell Hemoglobin : 30.4 pg  Mean Cell Hemoglobin Concentration : 33.6 g/dL  Auto Neutrophil # : 4.23 K/uL  Auto Lymphocyte # : 2.75 K/uL  Auto Monocyte # : 0.33 K/uL  Auto Eosinophil # : 0.07 K/uL  Auto Basophil # : 0.03 K/uL  Auto Neutrophil % : 57.0 %  Auto Lymphocyte % : 37.0 %  Auto Monocyte % : 4.4 %  Auto Eosinophil % : 0.9 %  Auto Basophil % : 0.4 %    12-    141  |  106  |  12  ----------------------------<  84  4.3   |  25  |  0.71    Ca    9.0      05 Dec 2024 15:00  Mg     1.90     12-    TPro  6.5  /  Alb  4.1  /  TBili  0.3  /  DBili  x   /  AST  14  /  ALT  10  /  AlkPhos  50  12-05      proBNP:   Lipid Profile:   HgA1c:   TSH:       CARDIAC MARKERS:            Interpretation of Telemetry: 	    ECG:  	  RADIOLOGY:  OTHER: 	    PREVIOUS DIAGNOSTIC TESTING:    [ ] Echocardiogram:  [ ] Catheterization:  [ ] Stress Test:  	    Assessment:  49 year old female with PMHx of CAD, HLD and GERD who presents to the ED with chest tightness    Recs:  cardiac stable  no e/o acs by ecg or biomarkers  [facility] today for new onset unstable angina  cw cardiac meds  recent op normal lv/rv rxn, no sig vhd; no need to repeat at present  tob cessation  will follow          Greater than 60 minutes spent on total encounter; more than 50% of the visit was spent counseling and/or coordinating care by the attending physician.   	  Nate Ulloa MD   Cardiovascular Diseases  [phone]

## 2024-12-06 NOTE — DISCHARGE NOTE NURSING/CASE MANAGEMENT/SOCIAL WORK - NSTRANSFEREYEGLASSESPAIRS_GEN_A_NUR
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 drastic change and his primary reason for coming in for evaluation was his altered speech.  No ischemic EKG changes.  -No further cardiac work-up indicated at this time     3.  Coronary artery disease: The patient had an abnormal stress test at [facility] followed by 3 drug-eluting stents placed to his RCA in May 2021-the patient states one isolated episode of chest burning prompted the order for the stress test.  -Currently stable, no angina  -Continue Plavix 75 mg daily, in addition to full anticoagulation (currently on therapeutic Lovenox, but has been taking Eliquis 5 mg twice daily at home).  Not on aspirin due to being anticoagulated.  -Continue statin  -Continue beta-blocker     4.  Right lower extremity DVT: Diagnosed 8/6/2021-he reports he has been compliant with his Eliquis, which he has been on chronically for his history of atrial fibrillation and flutter.     5.  History of nonischemic cardiomyopathy/chronic systolic congestive heart failure: Most recent LVEF in May 2021 was 35 to 40% by echo.  -Currently compensated/euvolemic on exam.  BNP is normal as well.  -He " 1 pair states he is no longer on losartan per [facility] cardiology due to hypotension  -He takes Lopressor daily-transition this to Toprol-XL  -He takes Lasix 20 mg daily at home, and has not recently required any additional doses.  He actually states he has been losing weight.  He has dyspnea on exertion, but no orthopnea, PND, edema.  Lasix is currently held.  Monitor volume status closely and resume if needed for signs of volume overload.     6.  History of severe mitral regurgitation status post repair at [place] in August 2020.  Most recent echo above.     7.  Paroxysmal atrial fibrillation: History of ablation around November 2019.  Notes indicate he had recurrent atrial fibrillation after his ablation with associated CHF (notes from his cardiology providers indicate his nonischemic cardiomyopathy may be related to rapid atrial fibrillation).  The patient also tells me he developed atrial flutter and had frequent PVCs (25%) and had ablations for his atrial flutter and PVCs in March 2021.  He did undergo left atrial appendage ligation in August 2020 at the time of his mitral valve repair, but remains anticoagulation due to YELENA leak.  -Monitor telemetry-maintain normal sinus rhythm  -Continue anticoagulation-currently on therapeutic Lovenox, okay to transition back to Eliquis 5 mg twice daily when okay with others.     8.  Possible splenic infarcts on imaging this admission     Cardiology will sign off, please recall as needed.     I discussed the patient's findings and my recommendations with patient and nursing staff.      Electronically signed by LUDWIG Forbes, 08/11/21, 10:15 AM CDT.          Information     Department Encounter #   8/10/2021  9:39 AM Bh Pad 4b [id number]      Monica Bentley APRN   Nurse Practitioner   Neurology   Consults      Cosign Needed   Date of Service:  08/11/21 1015   Creation Time:  08/11/21 1015            Cosign Needed        Expand AllCollapse All      Show:Clear  all  [x]Manual[x]Template[]Copied    Added by:  [x]Monica Bentley APRN    []Melquiades for details  Neurology Consult Note     Referring Provider: Shad Curiel MD  Reason for Consultation: confusion        History of present illness:    This is a 56 y.o. right handed male patient with PMH of atrial fibrillation, s/p ablation, left atrial appendage ligation, MAZE procedure (patient follows [place] cardiology and [facility]), LINQ implant, KELLY RCA 5/27/2021, chronic anticoagulation, BPH, HTN, Ed, HLD. Patient is on disability since January 2021 as  secondary to cardiac history. History is obtained by patient, wife Amparo by phone, and medical records. Patient has been taking Plavix with Eliquis since stent placed. Patient is former tobacco user stopping in 2007. He also would have a few drinks per week but stopped this all together a few weeks ago. Interesting, he does make his own wine. Neurology has been consulted for complaints of confusion.      Patient has been ill for about 1 month but had been difficult to pinpoint the exact time line as symptoms have been vague. Patient has had HAs that began about 1 month ago and he typically does not have HA. Also for about 1 month, would have daily to several days per week of elevated Temp 101. He would begin chilling and take tylenol. The next AM temp would be 98-99. Wife also reports intense unquenchable thirst and patient has lost about 20 pounds unintentionally in the last 4-6 weeks. Patient does report history of tick bites. He denies rash. He did have flank and hip pain. He had seen PCP 7/27/2021. He did take doxycycline a few days ago. Patient presented to [facility] ED on 8/6/2021 with right leg pain and found to have DVT. Patient was given prescription for Norco and flexeril. He did take a few doses of Norco.   Wife does report that for a month or longer, she would notice that patient would have difficulty forming thoughts at times. Wife also states that  since the cardiac stent 5/27/2021 he has had hypotension with BP dropping to 90s. Patient had labs drawn on 8/9/2021 for Lyme disease, Ehrlichia ab, RMSF and results are pending.  Patient denies nuchal rigidity.      8/10/2021 patient woke in usual state. He was talking to his wife and suddenly began to have difficulty forming words. He noted the left side of his tongue was numb. Wife states speech was slurred and mumbled. She called 911. Patient was able to ambulate to the kitchen and no unilateral weakness was noted. Wife was helping patient dress and noted that he was dis coordination on right. She denies seeing facial weakness. Patient tells me he recalls the ambulance ride and that by time he arrived to [facility] ED symptoms resolved but may have had a brief return of his symptoms but then they completely resolved and has not had return since. However, when patient arrived to ED, he reported chest pain and had chest pain work up. Troponin were elevated 0.65. It was later that the ED RN reported to ED provider the above symptoms. Patient did have CT head that was negative. EKG was sinus rhythm. UDS showed presence of opiates which he is prescribed. Patient has been afebrile since admission.      Labs:  Ammonia- 14  LDL 95  Mg+ 2.1  TSH- 0.812                         Active Hospital Problems     Diagnosis   POA   • Confusion [R41.0]   Yes   • Numbness of tongue [R20.0]   Yes   • Elevated troponin [R77.8]   Yes         Impression  1. Transient episode of confusion with impaired speech and numbness left side of tongue.   2. Right lower extremity DVT.  3. FUO  has had elevated temperature up to 101 on/off for about 1 month with history recent tick bites and  4. Unintentional weight loss. 20 pound weight loss in the last 4-6 weeks.  5. History of atrial fibrillation s/p ablation with left atrial appendage ligation on chronic anticoagulation and recent coronary stent on Plavix.      Plan  1. MRI Brain with and without  today.  2. B12, TSH, MG, lipid panel, A1C, P2Y12.   3. If MRI brain shows stroke will need to complete stroke work up with transthoracic echo and carotid duplex scan.  4. Patient will likely require lumbar puncture and he is on Eliquis and Plavix for cardiac reasons. He will need to be transition. Eliquis currently on hold and patient on Lovenox.. would like to review with cardiology before transition to Heparin drip as patient will need to be off anticoagulation 24 hours prior to LP and no more than 24 hours after LP.   5. Linq interrogation.   6. Defer work up for weight loss to attending.         I discussed the patients findings and my recommendations with patient, family and nursing staff     LUDWIG Gallagher  08/11/21  10:15 CDT          Current Facility-Administered Medications   Medication Dose Route Frequency Provider Last Rate Last Admin   • [START ON 8/12/2021] atorvastatin (LIPITOR) tablet 40 mg  40 mg Oral Daily Malgorzata Mora APRN       • clopidogrel (PLAVIX) tablet 75 mg  75 mg Oral Daily Shad Curiel MD   75 mg at 08/11/21 0925   • cyclobenzaprine (FLEXERIL) tablet 5 mg  5 mg Oral TID PRN Shad Curiel MD       • enoxaparin (LOVENOX) syringe 100 mg  1 mg/kg Subcutaneous Q12H Hugo Tay MD   100 mg at 08/11/21 0925   • HYDROcodone-acetaminophen (NORCO) 5-325 MG per tablet 1 tablet  1 tablet Oral Q6H PRN Shad Curiel MD       • [START ON 8/12/2021] metoprolol succinate XL (TOPROL-XL) 24 hr tablet 25 mg  25 mg Oral Q24H Malgorzata Mora APRN       • sodium chloride 0.9 % flush 10 mL  10 mL Intravenous PRN Shad Curiel MD       • sodium chloride 0.9 % flush 10 mL  10 mL Intravenous Q12H Shad Curiel MD   10 mL at 08/10/21 2105   • sodium chloride 0.9 % flush 10 mL  10 mL Intravenous PRN Shad Curiel MD       • tamsulosin (FLOMAX) 24 hr capsule 0.4 mg  0.4 mg Oral Daily Shad Curiel MD   0.4 mg at 08/11/21 0925

## 2024-12-06 NOTE — DISCHARGE NOTE PROVIDER - NSDCMRMEDTOKEN_GEN_ALL_CORE_FT
cholecalciferol: 2,000 international unit(s) orally once a day  cyanocobalamin: 1,000 microgram(s) orally once a day

## 2024-12-06 NOTE — DISCHARGE NOTE NURSING/CASE MANAGEMENT/SOCIAL WORK - PATIENT PORTAL LINK FT
You can access the FollowMyHealth Patient Portal offered by [facility] by registering at the following website: [email or web address]. By joining [facility]’s FollowMyHealth portal, you will also be able to view your health information using other applications (apps) compatible with our system.

## 2024-12-06 NOTE — DISCHARGE NOTE PROVIDER - NSDCCAREPROVSEEN_GEN_ALL_CORE_FT
Marissa Roque Marissa Gonzalez  Ordering Physician  User ADM  Nate Ulloa  Team [facility] Medicine ACP      [ Greater than 35 min spent for discharge services.   CARYL Roque ]       ( Note written / Date of service is the same as last day of patient stay  in the hospital ( for billing purposes)))

## 2024-12-06 NOTE — PRE PROCEDURE NOTE - PRE PROCEDURE EVALUATION
Pre Procedural Sedation Evaluation    Proceduralist: Dr. Rodney Bess    History and physical reviewed  Medications reviewed  Labs reviewed  EKG reviewed    Anticoagulation: N/A  Urine pregnancy: Negative  Dentures: None  Last PO intake: 12/6/2024 at 08:30am (ate breakfast this morning)    Pre-Procedure Physical Assessment  Mental status: Alert and oriented x 3  Level of consciousness: 1  Cardiac assessment: Stable  Pulmonary assessment: Stable  Obstructive sleep apnea: No  Aspiration risk: No  Mallampati score: 2  ASA Classification: 2  Prior Sedative or Anesthesia Experience: No complications  Informed consent by responsible adult: Yes  Based on today's assessment, anesthesia consult requested: No (patient not NPO, avoid sedation)

## 2024-12-06 NOTE — DISCHARGE NOTE NURSING/CASE MANAGEMENT/SOCIAL WORK - FINANCIAL ASSISTANCE
[facility] provides services at a reduced cost to those who are determined to be eligible through [facility]’s financial assistance program. Information regarding [facility]’s financial assistance program can be found by going to [email or web address] or by calling [phone].

## 2024-12-06 NOTE — DISCHARGE NOTE PROVIDER - HOSPITAL COURSE
49 years old f pmhx GERD, CAD, p/w episode of Chest tightness, dizziness, shortness of breath starting 1 hour prior to arrival,      Chest pain.   Nonspecific chest pain without troponinemia; ECG with no ischemic changes; Hemodynamically stable. No evidence of volume overload or shock on exam. Low suspicion ACS given positional, and reproducible pain by palpation. Low suspicion for PE (Wells score 0), PTX, aortic dissection, PNA, cardiac effusion or tamponade. CP likely 2/2 costochondritis. Other differentials include GERD.   -HEART score: 3 ;CAD risk factors include Age 49; HLD, Smoking  -Troponin: <6  -CXR: no acute process  -pro-BNP 44  -ASA & Statin   RRA site: NO hematoma, pain, swelling, bleeding, discharge  PUlses 2+ BIlaterally  - 12/6 LHC: Normal coronary arteries. RRA access.   Hyperlipidemia.   Start Atorvastatin 20 mg QD   49 years old f pmhx GERD, CAD, p/w episode of Chest tightness, dizziness, shortness of breath starting 1 hour prior to arrival,      Chest pain.   Nonspecific chest pain without troponinemia; ECG with no ischemic changes; Hemodynamically stable. No evidence of volume overload or shock on exam. Low suspicion ACS given positional, and reproducible pain by palpation. Low suspicion for PE (Wells score 0), PTX, aortic dissection, PNA, cardiac effusion or tamponade. CP likely 2/2 costochondritis. Other differentials include GERD.   -HEART score: 3 ;CAD risk factors include Age 49; HLD, Smoking  -Troponin: <6  -CXR: no acute process  -pro-BNP 44  -ASA & Statin   RRA site: NO hematoma, pain, swelling, bleeding, discharge  Pulses 2+ Bilaterally + cap refill < 2 sec     12/6 LHC: Normal coronary arteries. RRA access.       Case discussed with Dr Roque and DR Ulloa. Patient stable for discharge home.

## 2024-12-06 NOTE — PROGRESS NOTE ADULT - ASSESSMENT
_________________________________________________________________________________________  ========>>  M E D I C A L   A T T E N D I N G    F O L L O W  U P  N O T E  <<=========  -----------------------------------------------------------------------------------------------------    - Patient seen and examined by me earlier today.   - In summary,  ELMER STEPHENS is a 49y year old woman admitted with CP  - Patient today overall doing ok, comfortable, CP free, awaiting cath     ==================>> REVIEW OF SYSTEM <<=================    GEN: no fever, no chills, no pain  RESP: no SOB, no cough, no sputum  CVS: no chest pain, no palpitations  GI: no abdominal pain, no nausea  : no dysuria, no frequency  Neuro: no headache, no dizziness    ==================>> PHYSICAL EXAM <<=================    GEN: A&O X 3 , NAD , comfortable, pleasant, calm   HEENT: NCAT, PERRL, MMM, hearing intact  CVS: S1S2 , regular , No M/R/G appreciated  PULM: CTA B/L,  no W/R/R appreciated  ABD.: soft. non tender, non distended,  bowel sounds present  Extrem: intact pulses , no edema           ( Note written / Date of service 12-06-24 ( This is certified to be the same as "ENTERED" date above ( for billing purposes)))    ==================>> MEDICATIONS <<====================    MEDICATIONS  (STANDING):  aspirin enteric coated 81 milliGRAM(s) Oral daily  atorvastatin 20 milliGRAM(s) Oral at bedtime  enoxaparin Injectable 40 milliGRAM(s) SubCutaneous every 24 hours  influenza   Vaccine 0.5 milliLiter(s) IntraMuscular once    MEDICATIONS  (PRN):  acetaminophen     Tablet .. 650 milliGRAM(s) Oral every 6 hours PRN Temp greater or equal to 38C (100.4F), Mild Pain (1 - 3)    ___________  Active diet:  Diet, DASH/TLC:   Sodium & Cholesterol Restricted  ___________________    ==================>> VITAL SIGNS <<==================    T(C): 36.7 (12-06-24 @ 08:49), Max: 36.8 (12-05-24 @ 17:10)  HR: 59 (12-06-24 @ 08:49) (51 - 61)  BP: 97/60 (12-06-24 @ 08:49) (93/55 - 107/57)  BP(mean): 71 (12-05-24 @ 17:10)  RR: 16 (12-06-24 @ 08:49) (12 - 18)  SpO2: 99% (12-06-24 @ 08:49) (98% - 100%)      ==================>> LAB AND IMAGING <<==================                        13.0   7.43  )-----------( 254      ( 05 Dec 2024 15:15 )             38.7        12-05    141  |  106  |  12  ----------------------------<  84  4.3   |  25  |  0.71    Ca    9.0      05 Dec 2024 15:00  Mg     1.90     12-05    TPro  6.5  /  Alb  4.1  /  TBili  0.3  /  DBili  x   /  AST  14  /  ALT  10  /  AlkPhos  50  12-05             ___________________________________________________________________________________  ===============>>  A S S E S S M E N T   A N D   P L A N <<===============  ------------------------------------------------------------------------------------------    · Assessment	  49 year old female with PMHx of CAD, GERD, HLD who presents to the ED with chest tightness. Exam notable for TTP of the chest wall. Troponin negative. ECG with sinus bradycardia. Pain likely 2/2 costochondritis. Given cardiac hx patient admitted for observation.       Problem/Plan - 1:  ·  Problem: Chest pain.   ·  Plan: ::Nonspecific chest pain without troponinemia;      Low suspicion ACS given positional, and reproducible pain by palpation. Low suspicion for PE (Wells score 0), PTX, aortic dissection, PNA, cardiac effusion or tamponade.   CP likely 2/2 costochondritis. Other differentials include GERD.   -CXR: no acute process  -pro-BNP 44  -Admitted to Telemetry  -ASA & Statin   - cardio appreciated  - cardiac cath today    Problem/Plan - 2:  ·  Problem: CAD (coronary artery disease).   Continue Current medications otherwise and monitor.  cardio appreciated  cath today    Problem/Plan - 3:  ·  Problem: Hyperlipidemia.   ·  Plan: -Start Atorvastatin 20 mg QD  -Lipid panel ordered.    Problem/Plan - 4:  ·  Problem: Need for prophylactic measure.   ·  Plan: VTE PPx: Lovenox  Nutrition: DASH/TLC Diet  Fluids: None  Electrolytes: Maintain K>4, Mag >2, Phos > 3  Access: PIV  Code Status: FULL  Dispo: pending clinical improvement.    --------------------------------------------  Case discussed with patient, ACP, cardio   Education given on findings and plan of care  ___________________________  H. FRED Roque.  Pager: [phone]

## 2025-01-11 ENCOUNTER — EMERGENCY (EMERGENCY)
Facility: HOSPITAL | Age: 50
LOS: 0 days | Discharge: LEFT BEFORE DISCHARGE | End: 2025-01-11
Payer: MEDICAID

## 2025-01-11 VITALS
WEIGHT: 130.07 LBS | DIASTOLIC BLOOD PRESSURE: 52 MMHG | HEIGHT: 61 IN | OXYGEN SATURATION: 95 % | HEART RATE: 65 BPM | RESPIRATION RATE: 16 BRPM | SYSTOLIC BLOOD PRESSURE: 95 MMHG | TEMPERATURE: 98 F

## 2025-01-11 DIAGNOSIS — Z53.21 PROCEDURE AND TREATMENT NOT CARRIED OUT DUE TO PATIENT LEAVING PRIOR TO BEING SEEN BY HEALTH CARE PROVIDER: ICD-10-CM

## 2025-01-11 DIAGNOSIS — R10.31 RIGHT LOWER QUADRANT PAIN: ICD-10-CM

## 2025-01-11 DIAGNOSIS — Z98.891 HISTORY OF UTERINE SCAR FROM PREVIOUS SURGERY: Chronic | ICD-10-CM

## 2025-01-11 PROBLEM — I25.10 ATHEROSCLEROTIC HEART DISEASE OF NATIVE CORONARY ARTERY WITHOUT ANGINA PECTORIS: Chronic | Status: ACTIVE | Noted: 2024-12-05

## 2025-01-11 PROCEDURE — L9991: CPT

## 2025-01-11 NOTE — ED ADULT TRIAGE NOTE - CADM TRG TX PRIOR TO ARRIVAL
Cardiology follow up note    Date: 6/3/2020    Problem List:  Patient Active Problem List   Diagnosis   • Hypertension   • History of tobacco use   • Subarachnoid hemorrhage (CMS/HCC)   • Platelet dysfunction due to aspirin (CMS/HCC)    .   Neuro stopped asa plavix Focal subarachnoid hemorrhage involving the left posterior parietal  convexity.  CC:Nonobstructive coronary disease, hypertension.  Statin treatment and monitoring  S: Samir Combs is a 88 year old male who with a History of  has a past medical history of Essential (primary) hypertension and Stroke (CMS/HCC)., patient is here to follow up for Nonobstructive coronary disease, hypertension.  Statin treatment and monitoring. The patient had no acute events. Patient can do 4 mets without dyspnea  No chest pain, shortness of breath. Patient is taking all  cardiac  medications       Review of Systems:    Constitutional: Negative for fever and chills.   HEENT: Negative for ear pain or sore throat.  Respiratory: Negative cough or hemoptysis.    Gastrointestinal: Negative for nausea, vomiting, diarrhea or abdominal pain.   Genitourinary: Negative for dysuria, urgency or frequency.              Neurological: Negative for headache, loss of consciousness, confusion                         All other systems are reviewed and are negative except as documented in the HPI (History of Present Illness).    Medications:  Current Outpatient Medications   Medication Sig Dispense Refill   • escitalopram (LEXAPRO) 10 MG tablet Take 1 tablet by mouth daily. 90 tablet 3   • docusate sodium-sennosides (SENOKOT S) 50-8.6 MG per tablet Take 1 tablet by mouth 2 times daily as needed for Constipation.     • atorvastatin (LIPITOR) 20 MG tablet Take 1 tablet by mouth daily. 90 tablet 2   • isosorbide mononitrate (IMDUR) 30 MG 24 hr tablet Take 1 tablet by mouth daily. 90 tablet 0   • latanoprost (XALATAN) 0.005 % ophthalmic solution Place 1 drop into both eyes nightly. 2.5 mL 12   •  polyethylene glycol (GLYCOLAX, MIRALAX) packet Take 17 g by mouth daily. Do not start before May 12, 2019. 14 each 0   • timolol (TIMOPTIC XE) 0.5 % ophthalmic gel-forming Place 1 drop into both eyes daily.     • hydrochlorothiazide (HYDRODIURIL) 25 MG tablet Take 25 mg by mouth daily.     • cholecalciferol (VITAMIN D3) 1000 UNITS tablet Take 1,000 Units by mouth daily.       No current facility-administered medications for this visit.        Allergies:  ALLERGIES: no known allergies.    Social History:  PAST MEDICAL HX:    Essential (primary) hypertension                              Stroke (CMS/HCC)                                              PAST SURGICAL HX:    CARDIAC CATHETERIZATION                                         Comment: 35% LAD    No family history on file.  Review of patient's family status indicates:  No family status on file      Social History     Socioeconomic History   • Marital status: /Civil Union     Spouse name: Not on file   • Number of children: Not on file   • Years of education: Not on file   • Highest education level: Not on file   Occupational History   • Not on file   Social Needs   • Financial resource strain: Not on file   • Food insecurity:     Worry: Not on file     Inability: Not on file   • Transportation needs:     Medical: Not on file     Non-medical: Not on file   Tobacco Use   • Smoking status: Former Smoker     Packs/day: 0.00   • Smokeless tobacco: Never Used   Substance and Sexual Activity   • Alcohol use: Yes     Alcohol/week: 7.0 standard drinks     Types: 7 Shots of liquor per week     Frequency: 4 or more times a week     Drinks per session: 1 or 2     Binge frequency: Never     Comment: 1 martini/ night   • Drug use: Never   • Sexual activity: Not on file   Lifestyle   • Physical activity:     Days per week: Not on file     Minutes per session: Not on file   • Stress: Not on file   Relationships   • Social connections:     Talks on phone: Not on file      Gets together: Not on file     Attends Druze service: Not on file     Active member of club or organization: Not on file     Attends meetings of clubs or organizations: Not on file     Relationship status: Not on file   • Intimate partner violence:     Fear of current or ex partner: Not on file     Emotionally abused: Not on file     Physically abused: Not on file     Forced sexual activity: Not on file   Other Topics Concern   • Not on file   Social History Narrative   • Not on file         O:   Visit Vitals  /86   Pulse 64   Resp 16   Ht 5' 10\" (1.778 m)   Wt 82.8 kg   BMI 26.20 kg/m²       Vital Most Recent Value First Value   Weight 82.8 kg Weight: 82.8 kg   Height 5' 10\" (177.8 cm) Height: 5' 10\" (177.8 cm)         Physical Exam:    Neurological/psychiatric. Patient is oriented to time place and person. Patient does not have anxiety or agitation  Constitutional: Well-developed appear in good nutrition.  Mouth/Throat: Oropharynx is clear and moist.   Eyes: Conjunctivae is  normal. Bilateral pupils are round and normal diameter.    Neck:   No obvious jugular vein distention no del rio A wave    Cardiovascular:    Palpation of the heart demonstrated normal size and location of point of  maximal impact, no thrills, no palpable S3.   Auscultation of the heart demonstrated :  Sinus rhythm normal rate  normal S1 and S2 no murmur.   Carotid arteries: have no bruit.   Abdominal aorta: No palpable abdominal mass no bruit.   Femoral artery: Pulse +1 bilaterally, no bruits   Pedal pulses:Pulse +1 bilaterally.   Bilateral lower extremity edema negative    Gastrointestinal/Abdominal: Soft. Bowel sounds are normal.  no distension and no mass. No significant enlargement of liver and spleen.    Respiratory: Normal respiratory effort Breath sounds  No respiratory distress.  no wheezes.  no rales.      Skin: no rash, warm              Assessment and Plan:    Coronary disease stable Plavix was stopped due to bleeding.   Continue current medications.    Travis Knapp MD    [phone]    [facility]       none

## 2025-01-11 NOTE — ED ADULT TRIAGE NOTE - CHIEF COMPLAINT QUOTE
complaining of lower abdominal pain radiating to RLQ, started 3 days ago. Took Ibuprofen 30 mins ago.

## 2025-01-18 ENCOUNTER — EMERGENCY (EMERGENCY)
Facility: HOSPITAL | Age: 50
LOS: 1 days | Discharge: HOME | End: 2025-01-18
Attending: STUDENT IN AN ORGANIZED HEALTH CARE EDUCATION/TRAINING PROGRAM | Admitting: STUDENT IN AN ORGANIZED HEALTH CARE EDUCATION/TRAINING PROGRAM
Payer: MEDICAID

## 2025-01-18 VITALS
WEIGHT: 130.07 LBS | RESPIRATION RATE: 18 BRPM | DIASTOLIC BLOOD PRESSURE: 69 MMHG | HEIGHT: 61 IN | TEMPERATURE: 98 F | HEART RATE: 68 BPM | OXYGEN SATURATION: 99 % | SYSTOLIC BLOOD PRESSURE: 111 MMHG

## 2025-01-18 VITALS
SYSTOLIC BLOOD PRESSURE: 97 MMHG | TEMPERATURE: 98 F | OXYGEN SATURATION: 100 % | DIASTOLIC BLOOD PRESSURE: 51 MMHG | RESPIRATION RATE: 16 BRPM | HEART RATE: 62 BPM

## 2025-01-18 DIAGNOSIS — Z98.891 HISTORY OF UTERINE SCAR FROM PREVIOUS SURGERY: Chronic | ICD-10-CM

## 2025-01-18 LAB
ALBUMIN SERPL ELPH-MCNC: 3.6 G/DL — SIGNIFICANT CHANGE UP (ref 3.3–5)
ALP SERPL-CCNC: 51 U/L — SIGNIFICANT CHANGE UP (ref 40–120)
ALT FLD-CCNC: 17 U/L — SIGNIFICANT CHANGE UP (ref 4–33)
ANION GAP SERPL CALC-SCNC: 10 MMOL/L — SIGNIFICANT CHANGE UP (ref 7–14)
APPEARANCE UR: CLEAR — SIGNIFICANT CHANGE UP
AST SERPL-CCNC: 13 U/L — SIGNIFICANT CHANGE UP (ref 4–32)
BACTERIA # UR AUTO: NEGATIVE /HPF — SIGNIFICANT CHANGE UP
BASOPHILS # BLD AUTO: 0.12 K/UL — SIGNIFICANT CHANGE UP (ref 0–0.2)
BASOPHILS NFR BLD AUTO: 0.8 % — SIGNIFICANT CHANGE UP (ref 0–2)
BILIRUB SERPL-MCNC: 0.3 MG/DL — SIGNIFICANT CHANGE UP (ref 0.2–1.2)
BILIRUB UR-MCNC: NEGATIVE — SIGNIFICANT CHANGE UP
BUN SERPL-MCNC: 15 MG/DL — SIGNIFICANT CHANGE UP (ref 7–23)
CALCIUM SERPL-MCNC: 8.2 MG/DL — LOW (ref 8.4–10.5)
CAST: 0 /LPF — SIGNIFICANT CHANGE UP (ref 0–4)
CHLORIDE SERPL-SCNC: 104 MMOL/L — SIGNIFICANT CHANGE UP (ref 98–107)
CO2 SERPL-SCNC: 25 MMOL/L — SIGNIFICANT CHANGE UP (ref 22–31)
COLOR SPEC: YELLOW — SIGNIFICANT CHANGE UP
CREAT SERPL-MCNC: 0.66 MG/DL — SIGNIFICANT CHANGE UP (ref 0.5–1.3)
DIFF PNL FLD: NEGATIVE — SIGNIFICANT CHANGE UP
EGFR: 106 ML/MIN/1.73M2 — SIGNIFICANT CHANGE UP
EOSINOPHIL # BLD AUTO: 0.14 K/UL — SIGNIFICANT CHANGE UP (ref 0–0.5)
EOSINOPHIL NFR BLD AUTO: 1 % — SIGNIFICANT CHANGE UP (ref 0–6)
GLUCOSE SERPL-MCNC: 81 MG/DL — SIGNIFICANT CHANGE UP (ref 70–99)
GLUCOSE UR QL: NEGATIVE MG/DL — SIGNIFICANT CHANGE UP
HCT VFR BLD CALC: 40.5 % — SIGNIFICANT CHANGE UP (ref 34.5–45)
HGB BLD-MCNC: 13.2 G/DL — SIGNIFICANT CHANGE UP (ref 11.5–15.5)
IANC: 7.7 K/UL — HIGH (ref 1.8–7.4)
IMM GRANULOCYTES NFR BLD AUTO: 4.1 % — HIGH (ref 0–0.9)
KETONES UR-MCNC: NEGATIVE MG/DL — SIGNIFICANT CHANGE UP
LEUKOCYTE ESTERASE UR-ACNC: NEGATIVE — SIGNIFICANT CHANGE UP
LYMPHOCYTES # BLD AUTO: 33.1 % — SIGNIFICANT CHANGE UP (ref 13–44)
LYMPHOCYTES # BLD AUTO: 4.71 K/UL — HIGH (ref 1–3.3)
MCHC RBC-ENTMCNC: 29.9 PG — SIGNIFICANT CHANGE UP (ref 27–34)
MCHC RBC-ENTMCNC: 32.6 G/DL — SIGNIFICANT CHANGE UP (ref 32–36)
MCV RBC AUTO: 91.6 FL — SIGNIFICANT CHANGE UP (ref 80–100)
MONOCYTES # BLD AUTO: 0.96 K/UL — HIGH (ref 0–0.9)
MONOCYTES NFR BLD AUTO: 6.8 % — SIGNIFICANT CHANGE UP (ref 2–14)
NEUTROPHILS # BLD AUTO: 7.7 K/UL — HIGH (ref 1.8–7.4)
NEUTROPHILS NFR BLD AUTO: 54.2 % — SIGNIFICANT CHANGE UP (ref 43–77)
NITRITE UR-MCNC: NEGATIVE — SIGNIFICANT CHANGE UP
NRBC # BLD: 0 /100 WBCS — SIGNIFICANT CHANGE UP (ref 0–0)
NRBC # FLD: 0 K/UL — SIGNIFICANT CHANGE UP (ref 0–0)
PH UR: 6 — SIGNIFICANT CHANGE UP (ref 5–8)
PLATELET # BLD AUTO: 370 K/UL — SIGNIFICANT CHANGE UP (ref 150–400)
POTASSIUM SERPL-MCNC: 3.9 MMOL/L — SIGNIFICANT CHANGE UP (ref 3.5–5.3)
POTASSIUM SERPL-SCNC: 3.9 MMOL/L — SIGNIFICANT CHANGE UP (ref 3.5–5.3)
PROT SERPL-MCNC: 5.7 G/DL — LOW (ref 6–8.3)
PROT UR-MCNC: NEGATIVE MG/DL — SIGNIFICANT CHANGE UP
RBC # BLD: 4.42 M/UL — SIGNIFICANT CHANGE UP (ref 3.8–5.2)
RBC # FLD: 12.9 % — SIGNIFICANT CHANGE UP (ref 10.3–14.5)
RBC CASTS # UR COMP ASSIST: 0 /HPF — SIGNIFICANT CHANGE UP (ref 0–4)
SODIUM SERPL-SCNC: 139 MMOL/L — SIGNIFICANT CHANGE UP (ref 135–145)
SP GR SPEC: 1.01 — SIGNIFICANT CHANGE UP (ref 1–1.03)
SQUAMOUS # UR AUTO: 1 /HPF — SIGNIFICANT CHANGE UP (ref 0–5)
UROBILINOGEN FLD QL: 0.2 MG/DL — SIGNIFICANT CHANGE UP (ref 0.2–1)
WBC # BLD: 14.22 K/UL — HIGH (ref 3.8–10.5)
WBC # FLD AUTO: 14.22 K/UL — HIGH (ref 3.8–10.5)
WBC UR QL: 1 /HPF — SIGNIFICANT CHANGE UP (ref 0–5)

## 2025-01-18 PROCEDURE — 99285 EMERGENCY DEPT VISIT HI MDM: CPT

## 2025-01-18 PROCEDURE — 74177 CT ABD & PELVIS W/CONTRAST: CPT | Mod: 26

## 2025-01-18 RX ORDER — KETOROLAC TROMETHAMINE 30 MG/ML
15 INJECTION INTRAMUSCULAR; INTRAVENOUS ONCE
Refills: 0 | Status: DISCONTINUED | OUTPATIENT
Start: 2025-01-18 | End: 2025-01-18

## 2025-01-18 RX ORDER — SODIUM CHLORIDE 9 MG/ML
1000 INJECTION, SOLUTION INTRAVENOUS ONCE
Refills: 0 | Status: DISCONTINUED | OUTPATIENT
Start: 2025-01-18 | End: 2025-01-18

## 2025-01-18 RX ORDER — CEFPODOXIME PROXETIL 100 MG/5ML
1 GRANULE, FOR SUSPENSION ORAL
Qty: 20 | Refills: 0
Start: 2025-01-18 | End: 2025-01-27

## 2025-01-18 RX ORDER — CEFTRIAXONE SODIUM 1 G/1
1000 INJECTION, POWDER, FOR SOLUTION INTRAMUSCULAR; INTRAVENOUS ONCE
Refills: 0 | Status: COMPLETED | OUTPATIENT
Start: 2025-01-18 | End: 2025-01-18

## 2025-01-18 RX ORDER — ACETAMINOPHEN 80 MG/.8ML
1000 SOLUTION/ DROPS ORAL ONCE
Refills: 0 | Status: COMPLETED | OUTPATIENT
Start: 2025-01-18 | End: 2025-01-18

## 2025-01-18 RX ADMIN — CEFTRIAXONE SODIUM 100 MILLIGRAM(S): 1 INJECTION, POWDER, FOR SOLUTION INTRAMUSCULAR; INTRAVENOUS at 20:39

## 2025-01-18 RX ADMIN — KETOROLAC TROMETHAMINE 15 MILLIGRAM(S): 30 INJECTION INTRAMUSCULAR; INTRAVENOUS at 14:11

## 2025-01-18 RX ADMIN — ACETAMINOPHEN 400 MILLIGRAM(S): 80 SOLUTION/ DROPS ORAL at 18:56

## 2025-01-18 RX ADMIN — ACETAMINOPHEN 400 MILLIGRAM(S): 80 SOLUTION/ DROPS ORAL at 14:12

## 2025-01-18 NOTE — ED PROVIDER NOTE - OBJECTIVE STATEMENT
50-year-old female patient past medical history CAD, GERD, constipation, status post  presents to ED for bilateral flank pain.  Patient is currently on day 5 of nitrofurantoin for confirmed UTI by PCP, patient states she does not think antibiotic is working and infection has spread up to her kidneys.  Patient also reports diffuse body swelling especially to left leg and face.  Patient is currently on prednisone taper for unknown reason. Reports hematuria. 50-year-old female patient past medical history CAD, GERD, constipation, hx of previous  presents to ED for bilateral flank pain.  Patient is currently on day 5 of nitrofurantoin for confirmed UTI by PCP, patient states she does not think antibiotic is working and infection has spread up to her kidneys.  Patient also reports diffuse body swelling especially to left leg and face.  Patient is currently on prednisone taper for unknown reason. Reports hematuria.

## 2025-01-18 NOTE — ED ADULT TRIAGE NOTE - CHIEF COMPLAINT QUOTE
patient states" I am having infection to my kidney and being treated with nitrofurantoin and I am having swelling to my entire body with pain all over the body  and pain to right bottom of  the foot  since yesterday " denies fever

## 2025-01-18 NOTE — ED ADULT TRIAGE NOTE - BEFAST FACE
From: Kike Corrales  To: Angus Balderas  Sent: 11/17/2020 3:41 PM CST  Subject: After-Visit Question    Roel Uribe,    Just checking in with a question, I am a little over 2 weeks post surgery and 1 week post getting my catheter removed, things have gone well over all and I currently have minimal incontinence and improving daily.    However I am experiencing some chills and a general feeling of being tired, possibly had a low grade fever last week. I got tested for Covid and was negative.....this has been going on since last Thursday, things seem to be a bit better every day but I still am having off and on chills and a general uneasy feeling? I don't have any other cold like or flu like symptoms, it just feels like I have some type of infection that my body is fighting? I didn't know if this could be some type of mild UTI hanging on?    I guess I just wanted to check in and see if these were some typical signs of recovery at this stage or if there is something I should do? I did finish the Cipro last week as instructed.    Thanks,  Kike  
No

## 2025-01-18 NOTE — ED PROVIDER NOTE - ATTENDING CONTRIBUTION TO CARE
51 yo F who presents to the ED c/o back pain. She reports that she recently saw her PMD, was diagnosed with a UTI and placed on nitrofurantoin. SHe reports that since then her urinary symptoms have not improved and she is now experiencing bilateral lower back pain. She also thinks that the nitrofurantoin has led to full body swelling. ON exam, no appreciable facial swelling, no BLE edema. + R sided CVA tenderness. Plan for labs, UA, CT abd/pelvis to evaluate for stone vs pyelo

## 2025-01-18 NOTE — ED PROVIDER NOTE - PHYSICAL EXAMINATION
GENERAL: NAD  HEENT:  Atraumatic  CHEST/LUNG: Chest rise equal bilaterally, clear breath sounds b/l  HEART: Regular rate and rhythm  ABDOMEN: Soft, Nontender, Nondistended. b/l CVA tenderness  EXTREMITIES:  Extremities warm  PSYCH: A&Ox3  SKIN: No obvious rashes or lesions

## 2025-01-18 NOTE — ED ADULT NURSE NOTE - OBJECTIVE STATEMENT
A&Ox4. ambulatory. c/o bilateral upper and lower extremity swelling. NAD. PT states she has been taking a medication for a kidney infection since January. yesterday PT noticed swelling to bilateral upper and lower extremities and a green tint to her urine. . pt denies SOB, chest pain, dizziness, weakness, urinary symptoms, HA, n/v/d, fevers, chills, pain. respirations are even and un labored. skin intact. 20g placed to LAC. labs drawn and sent. safety precautions maintained.

## 2025-01-18 NOTE — ED PROVIDER NOTE - NSFOLLOWUPINSTRUCTIONS_ED_ALL_ED_FT
You have been evaluated in the Emergency Department today for your urinary symptoms. Your evaluation, including CT scan, suggests that your symptoms are due to a urinary tract infection that has moved up to your kidney on the right side. Stop taking your macrobid and instead take cefpodoxime that we have prescribed. Please take the cefpodoxime for the full course of the medication for a full 10 days as directed.    Please follow up with your primary care physician within two days.    Return to the emergency department if you experience fevers 100.4° or greater, worsening or uncontrolled pain, vomiting, flank pain, or for any other concerning symptoms.

## 2025-01-18 NOTE — ED PROVIDER NOTE - PATIENT PORTAL LINK FT
You can access the FollowMyHealth Patient Portal offered by [facility] by registering at the following website: [email or web address]. By joining [employer]’s FollowMyHealth portal, you will also be able to view your health information using other applications (apps) compatible with our system.

## 2025-01-18 NOTE — ED PROVIDER NOTE - PROGRESS NOTE DETAILS
Vangie Davis MD, [facility] PGY-1: Patient re-assessed.  Vitals stable.  Test results explained to patient including CT scan showing right-sided pyelonephritis. Will give ceftriaxone here with patient to be discharged on 10 days of cefpodoxime. Discussed with patient results of work up, strict return precautions, and need for follow up.  Patient and family expressed understanding and agreement with plan. Vangie Davis MD, [facility] PGY-1: Patient received as signout. History of UTI on day 5/7 of macrobid, on prednisone taper for unclear reason. CT pending to eval for pyelonephritis. Vangie Davis MD, EM PGY-1: Repeat blood pressure soft. Per patient, 90s/50s normal for her following prior cardiac surgery. She is feeling well without symptoms. Strict return precautions and need for follow up reiterated and patient encouraged to drink lots of fluids.

## 2025-01-18 NOTE — ED PROVIDER NOTE - CLINICAL SUMMARY MEDICAL DECISION MAKING FREE TEXT BOX
Pt is afebrile, HD stable, well appearing. Differentials: UTI vs. pyelonephritis. basic labs, pain control, IVF, UA, CT abdomen/pelvis to eval for kidney stone vs. pyelonephritis. Dispo pending labs and imaging.

## 2025-01-18 NOTE — ED PROVIDER NOTE - WORK/EXCUSE FORM DATE
Hello,     Patient is going to be seen at [facility]) Urology (NPI: [id number]) on 7/13, and a insurance referral is needed  The diagnosis code needed R97 20, and procedure code needed [place] 85[address]  That's everything needed for the appointment, thank you for your time and help 
20-Jan-2025

## 2025-01-19 LAB
CULTURE RESULTS: SIGNIFICANT CHANGE UP
SPECIMEN SOURCE: SIGNIFICANT CHANGE UP

## 2025-05-21 ENCOUNTER — EMERGENCY (EMERGENCY)
Facility: HOSPITAL | Age: 50
LOS: 1 days | End: 2025-05-21
Admitting: STUDENT IN AN ORGANIZED HEALTH CARE EDUCATION/TRAINING PROGRAM
Payer: COMMERCIAL

## 2025-05-21 VITALS
WEIGHT: 149.91 LBS | TEMPERATURE: 98 F | RESPIRATION RATE: 16 BRPM | HEART RATE: 62 BPM | OXYGEN SATURATION: 100 % | HEIGHT: 61 IN | DIASTOLIC BLOOD PRESSURE: 56 MMHG | SYSTOLIC BLOOD PRESSURE: 100 MMHG

## 2025-05-21 DIAGNOSIS — Z98.891 HISTORY OF UTERINE SCAR FROM PREVIOUS SURGERY: Chronic | ICD-10-CM

## 2025-05-21 LAB
ALBUMIN SERPL ELPH-MCNC: 4 G/DL — SIGNIFICANT CHANGE UP (ref 3.3–5)
ALP SERPL-CCNC: 49 U/L — SIGNIFICANT CHANGE UP (ref 40–120)
ALT FLD-CCNC: 15 U/L — SIGNIFICANT CHANGE UP (ref 4–33)
ANION GAP SERPL CALC-SCNC: 12 MMOL/L — SIGNIFICANT CHANGE UP (ref 7–14)
APPEARANCE UR: CLEAR — SIGNIFICANT CHANGE UP
AST SERPL-CCNC: 22 U/L — SIGNIFICANT CHANGE UP (ref 4–32)
BASOPHILS # BLD AUTO: 0.04 K/UL — SIGNIFICANT CHANGE UP (ref 0–0.2)
BASOPHILS NFR BLD AUTO: 0.4 % — SIGNIFICANT CHANGE UP (ref 0–2)
BILIRUB SERPL-MCNC: 0.5 MG/DL — SIGNIFICANT CHANGE UP (ref 0.2–1.2)
BILIRUB UR-MCNC: NEGATIVE — SIGNIFICANT CHANGE UP
BUN SERPL-MCNC: 10 MG/DL — SIGNIFICANT CHANGE UP (ref 7–23)
CALCIUM SERPL-MCNC: 8.8 MG/DL — SIGNIFICANT CHANGE UP (ref 8.4–10.5)
CHLORIDE SERPL-SCNC: 104 MMOL/L — SIGNIFICANT CHANGE UP (ref 98–107)
CO2 SERPL-SCNC: 23 MMOL/L — SIGNIFICANT CHANGE UP (ref 22–31)
COLOR SPEC: YELLOW — SIGNIFICANT CHANGE UP
CREAT SERPL-MCNC: 0.54 MG/DL — SIGNIFICANT CHANGE UP (ref 0.5–1.3)
DIFF PNL FLD: NEGATIVE — SIGNIFICANT CHANGE UP
EGFR: 111 ML/MIN/1.73M2 — SIGNIFICANT CHANGE UP
EGFR: 111 ML/MIN/1.73M2 — SIGNIFICANT CHANGE UP
EOSINOPHIL # BLD AUTO: 0.05 K/UL — SIGNIFICANT CHANGE UP (ref 0–0.5)
EOSINOPHIL NFR BLD AUTO: 0.5 % — SIGNIFICANT CHANGE UP (ref 0–6)
GLUCOSE SERPL-MCNC: 89 MG/DL — SIGNIFICANT CHANGE UP (ref 70–99)
GLUCOSE UR QL: NEGATIVE MG/DL — SIGNIFICANT CHANGE UP
HCT VFR BLD CALC: 39 % — SIGNIFICANT CHANGE UP (ref 34.5–45)
HGB BLD-MCNC: 13.2 G/DL — SIGNIFICANT CHANGE UP (ref 11.5–15.5)
IANC: 6.31 K/UL — SIGNIFICANT CHANGE UP (ref 1.8–7.4)
IMM GRANULOCYTES NFR BLD AUTO: 1.3 % — HIGH (ref 0–0.9)
KETONES UR QL: NEGATIVE MG/DL — SIGNIFICANT CHANGE UP
LEUKOCYTE ESTERASE UR-ACNC: NEGATIVE — SIGNIFICANT CHANGE UP
LIDOCAIN IGE QN: 26 U/L — SIGNIFICANT CHANGE UP (ref 7–60)
LYMPHOCYTES # BLD AUTO: 2.52 K/UL — SIGNIFICANT CHANGE UP (ref 1–3.3)
LYMPHOCYTES # BLD AUTO: 26.5 % — SIGNIFICANT CHANGE UP (ref 13–44)
MCHC RBC-ENTMCNC: 30.1 PG — SIGNIFICANT CHANGE UP (ref 27–34)
MCHC RBC-ENTMCNC: 33.8 G/DL — SIGNIFICANT CHANGE UP (ref 32–36)
MCV RBC AUTO: 89 FL — SIGNIFICANT CHANGE UP (ref 80–100)
MONOCYTES # BLD AUTO: 0.48 K/UL — SIGNIFICANT CHANGE UP (ref 0–0.9)
MONOCYTES NFR BLD AUTO: 5 % — SIGNIFICANT CHANGE UP (ref 2–14)
NEUTROPHILS # BLD AUTO: 6.31 K/UL — SIGNIFICANT CHANGE UP (ref 1.8–7.4)
NEUTROPHILS NFR BLD AUTO: 66.3 % — SIGNIFICANT CHANGE UP (ref 43–77)
NITRITE UR-MCNC: NEGATIVE — SIGNIFICANT CHANGE UP
NRBC # BLD AUTO: 0 K/UL — SIGNIFICANT CHANGE UP (ref 0–0)
NRBC # FLD: 0 K/UL — SIGNIFICANT CHANGE UP (ref 0–0)
NRBC BLD AUTO-RTO: 0 /100 WBCS — SIGNIFICANT CHANGE UP (ref 0–0)
PH UR: 7 — SIGNIFICANT CHANGE UP (ref 5–8)
PLATELET # BLD AUTO: 243 K/UL — SIGNIFICANT CHANGE UP (ref 150–400)
POTASSIUM SERPL-MCNC: 4.4 MMOL/L — SIGNIFICANT CHANGE UP (ref 3.5–5.3)
POTASSIUM SERPL-SCNC: 4.4 MMOL/L — SIGNIFICANT CHANGE UP (ref 3.5–5.3)
PROT SERPL-MCNC: 6.3 G/DL — SIGNIFICANT CHANGE UP (ref 6–8.3)
PROT UR-MCNC: NEGATIVE MG/DL — SIGNIFICANT CHANGE UP
RBC # BLD: 4.38 M/UL — SIGNIFICANT CHANGE UP (ref 3.8–5.2)
RBC # FLD: 12.9 % — SIGNIFICANT CHANGE UP (ref 10.3–14.5)
SODIUM SERPL-SCNC: 139 MMOL/L — SIGNIFICANT CHANGE UP (ref 135–145)
SP GR SPEC: 1.01 — SIGNIFICANT CHANGE UP (ref 1–1.03)
TROPONIN T, HIGH SENSITIVITY RESULT: <6 NG/L — SIGNIFICANT CHANGE UP
UROBILINOGEN FLD QL: 0.2 MG/DL — SIGNIFICANT CHANGE UP (ref 0.2–1)
WBC # BLD: 9.52 K/UL — SIGNIFICANT CHANGE UP (ref 3.8–10.5)
WBC # FLD AUTO: 9.52 K/UL — SIGNIFICANT CHANGE UP (ref 3.8–10.5)

## 2025-05-21 PROCEDURE — 99284 EMERGENCY DEPT VISIT MOD MDM: CPT

## 2025-05-21 PROCEDURE — 93010 ELECTROCARDIOGRAM REPORT: CPT

## 2025-05-21 RX ORDER — MAGNESIUM, ALUMINUM HYDROXIDE 200-200 MG
30 TABLET,CHEWABLE ORAL ONCE
Refills: 0 | Status: COMPLETED | OUTPATIENT
Start: 2025-05-21 | End: 2025-05-21

## 2025-05-21 RX ADMIN — Medication 30 MILLILITER(S): at 11:15

## 2025-05-21 RX ADMIN — Medication 20 MILLIGRAM(S): at 11:18

## 2025-05-21 NOTE — ED ADULT NURSE NOTE - OBJECTIVE STATEMENT
pt received spot intake 10c. pt A+ox3, c/o epigastric pain and abd bloating x 2 days. abd soft nondistended. respirations even and unlabored. pt is well appearing. labs sent, IVSL in place, medicated as ordered. will monitor.

## 2025-05-21 NOTE — ED PROVIDER NOTE - PATIENT PORTAL LINK FT
You can access the FollowMyHealth Patient Portal offered by [facility] by registering at the following website: [email or web address]. By joining [employer]’s FollowMyHealth portal, you will also be able to view your health information using other applications (apps) compatible with our system.

## 2025-05-21 NOTE — ED ADULT TRIAGE NOTE - CHIEF COMPLAINT QUOTE
pt c/o epigastric discomfort and bloating since yesterday. +nausea. hx. CAD. reflux disease. pt denies chest pain, constipation, fevers. pt well appearing.

## 2025-05-21 NOTE — ED PROVIDER NOTE - CLINICAL SUMMARY MEDICAL DECISION MAKING FREE TEXT BOX
50-year-old female history of pyelonephritis, GERD presents to ED complaining of epigastric pain x 2-3 days patient is also endorsing urinary frequency and nausea as well.  States had similar symptoms when she had "kidney problem" in January.  Denies any vomiting, diarrhea, fevers, flank pain, CP, SOB.  On exam patient noted to have mild epigastric tenderness as well as suprapubic tenderness.  No CVA tenderness.  Well-appearing, vital stable.  R/O gastritis versus UTI, check labs/UA, give Pepcid/Maalox and reassess.

## 2025-05-21 NOTE — ED PROVIDER NOTE - NSFOLLOWUPINSTRUCTIONS_ED_ALL_ED_FT
Rest and drink plenty of fluids. Avoid eating foods that can irritate your stomach such as citrus, caffeine, and dairy products. Advance your diet as tolerated. Take Pepcid 20mg twice a day for 1 week, 30 minutes before meals.  Follow up with your primary care doctor within 2-3 days of hospital discharge.  Follow up with a gastroenterologist within 1-2 weeks. If symptoms worsen, please come back to the emergency room.

## 2025-05-21 NOTE — ED PROVIDER NOTE - OBJECTIVE STATEMENT
50-year-old female history of pyelonephritis, GERD presents to ED complaining of epigastric pain x 2-3 days patient is also endorsing urinary frequency and nausea as well.  States had similar symptoms when she had "kidney problem" in January.  Denies any vomiting, diarrhea, fevers, flank pain, CP, SOB.

## 2025-05-21 NOTE — ED PROVIDER NOTE - CCCP TRG CHIEF CMPLNT
Today's Date: 9/15/2021  NAME: Liset Monte  : 1981    To Whom It May Concern:    Liset Monte has requested that I provide a work note for their employer.  This is to certify Liset Monte was evaluated on 9/15/2021.    Please excuse Liset Monte from any days missed over the last week and for up to 1 more week due to a medical problem.              Electronically signed by:   TERRA Man, ANI-[facility]  [address] [place], [place] [id number]            epigastric pain

## 2025-05-24 LAB
-  AMOXICILLIN/CLAVULANIC ACID: SIGNIFICANT CHANGE UP
-  AMPICILLIN/SULBACTAM: SIGNIFICANT CHANGE UP
-  AMPICILLIN: SIGNIFICANT CHANGE UP
-  AZTREONAM: SIGNIFICANT CHANGE UP
-  CEFAZOLIN: SIGNIFICANT CHANGE UP
-  CEFEPIME: SIGNIFICANT CHANGE UP
-  CEFOXITIN: SIGNIFICANT CHANGE UP
-  CEFTRIAXONE: SIGNIFICANT CHANGE UP
-  CEFUROXIME: SIGNIFICANT CHANGE UP
-  CIPROFLOXACIN: SIGNIFICANT CHANGE UP
-  ERTAPENEM: SIGNIFICANT CHANGE UP
-  GENTAMICIN: SIGNIFICANT CHANGE UP
-  IMIPENEM: SIGNIFICANT CHANGE UP
-  LEVOFLOXACIN: SIGNIFICANT CHANGE UP
-  MEROPENEM: SIGNIFICANT CHANGE UP
-  NITROFURANTOIN: SIGNIFICANT CHANGE UP
-  PIPERACILLIN/TAZOBACTAM: SIGNIFICANT CHANGE UP
-  TIGECYCLINE: SIGNIFICANT CHANGE UP
-  TOBRAMYCIN: SIGNIFICANT CHANGE UP
-  TRIMETHOPRIM/SULFAMETHOXAZOLE: SIGNIFICANT CHANGE UP
CULTURE RESULTS: ABNORMAL
METHOD TYPE: SIGNIFICANT CHANGE UP
ORGANISM # SPEC MICROSCOPIC CNT: ABNORMAL
ORGANISM # SPEC MICROSCOPIC CNT: ABNORMAL
SPECIMEN SOURCE: SIGNIFICANT CHANGE UP

## 2025-05-24 RX ORDER — CEFUROXIME SODIUM 1.5 G
1 VIAL (EA) INJECTION
Qty: 14 | Refills: 0
Start: 2025-05-24 | End: 2025-05-30

## 2025-06-02 ENCOUNTER — APPOINTMENT (OUTPATIENT)
Dept: GASTROENTEROLOGY | Facility: CLINIC | Age: 50
End: 2025-06-02
Payer: COMMERCIAL

## 2025-06-02 VITALS
HEIGHT: 61 IN | DIASTOLIC BLOOD PRESSURE: 60 MMHG | OXYGEN SATURATION: 96 % | RESPIRATION RATE: 16 BRPM | WEIGHT: 139.25 LBS | HEART RATE: 57 BPM | TEMPERATURE: 98.3 F | SYSTOLIC BLOOD PRESSURE: 98 MMHG | BODY MASS INDEX: 26.29 KG/M2

## 2025-06-02 DIAGNOSIS — A04.8 OTHER SPECIFIED BACTERIAL INTESTINAL INFECTIONS: ICD-10-CM

## 2025-06-02 DIAGNOSIS — R10.9 UNSPECIFIED ABDOMINAL PAIN: ICD-10-CM

## 2025-06-02 DIAGNOSIS — R10.11 RIGHT UPPER QUADRANT PAIN: ICD-10-CM

## 2025-06-02 DIAGNOSIS — K58.9 IRRITABLE BOWEL SYNDROME, UNSPECIFIED: ICD-10-CM

## 2025-06-02 DIAGNOSIS — R10.13 EPIGASTRIC PAIN: ICD-10-CM

## 2025-06-02 DIAGNOSIS — K21.9 GASTRO-ESOPHAGEAL REFLUX DISEASE W/OUT ESOPHAGITIS: ICD-10-CM

## 2025-06-02 DIAGNOSIS — N39.0 URINARY TRACT INFECTION, SITE NOT SPECIFIED: ICD-10-CM

## 2025-06-02 DIAGNOSIS — Z12.11 ENCOUNTER FOR SCREENING FOR MALIGNANT NEOPLASM OF COLON: ICD-10-CM

## 2025-06-02 DIAGNOSIS — R14.0 ABDOMINAL DISTENSION (GASEOUS): ICD-10-CM

## 2025-06-02 PROCEDURE — 99214 OFFICE O/P EST MOD 30 MIN: CPT

## 2025-06-02 PROCEDURE — 99204 OFFICE O/P NEW MOD 45 MIN: CPT

## 2025-06-02 RX ORDER — FAMOTIDINE 20 MG/1
20 TABLET, FILM COATED ORAL
Refills: 0 | Status: ACTIVE | COMMUNITY

## 2025-06-02 RX ORDER — SODIUM PICOSULFATE, MAGNESIUM OXIDE, AND ANHYDROUS CITRIC ACID 12; 3.5; 1 G/175ML; G/175ML; MG/175ML
10-3.5-12 MG-GM LIQUID ORAL
Qty: 1 | Refills: 0 | Status: ACTIVE | COMMUNITY
Start: 2025-06-02 | End: 1900-01-01

## 2025-06-02 RX ORDER — CEFUROXIME AXETIL 500 MG/1
500 TABLET, FILM COATED ORAL
Refills: 0 | Status: ACTIVE | COMMUNITY

## 2025-06-02 RX ORDER — PANTOPRAZOLE 40 MG/1
40 TABLET, DELAYED RELEASE ORAL DAILY
Qty: 30 | Refills: 3 | Status: ACTIVE | COMMUNITY
Start: 2025-06-02 | End: 1900-01-01

## 2025-07-22 ENCOUNTER — APPOINTMENT (OUTPATIENT)
Dept: GASTROENTEROLOGY | Facility: AMBULATORY MEDICAL SERVICES | Age: 50
End: 2025-07-22
Payer: COMMERCIAL

## 2025-07-22 ENCOUNTER — RESULT REVIEW (OUTPATIENT)
Age: 50
End: 2025-07-22

## 2025-07-22 PROCEDURE — 43239 EGD BIOPSY SINGLE/MULTIPLE: CPT | Mod: 59

## 2025-07-22 PROCEDURE — 45380 COLONOSCOPY AND BIOPSY: CPT | Mod: 33

## 2025-07-26 ENCOUNTER — APPOINTMENT (OUTPATIENT)
Dept: CT IMAGING | Facility: IMAGING CENTER | Age: 50
End: 2025-07-26
Payer: COMMERCIAL

## 2025-07-26 ENCOUNTER — OUTPATIENT (OUTPATIENT)
Dept: OUTPATIENT SERVICES | Facility: HOSPITAL | Age: 50
LOS: 1 days | End: 2025-07-26
Payer: COMMERCIAL

## 2025-07-26 DIAGNOSIS — Z98.891 HISTORY OF UTERINE SCAR FROM PREVIOUS SURGERY: Chronic | ICD-10-CM

## 2025-07-26 DIAGNOSIS — R10.9 UNSPECIFIED ABDOMINAL PAIN: ICD-10-CM

## 2025-07-26 PROCEDURE — 74160 CT ABDOMEN W/CONTRAST: CPT | Mod: 26

## 2025-07-26 PROCEDURE — 74160 CT ABDOMEN W/CONTRAST: CPT

## 2025-08-06 ENCOUNTER — APPOINTMENT (OUTPATIENT)
Dept: GASTROENTEROLOGY | Facility: CLINIC | Age: 50
End: 2025-08-06
Payer: COMMERCIAL

## 2025-08-06 VITALS
OXYGEN SATURATION: 97 % | DIASTOLIC BLOOD PRESSURE: 61 MMHG | HEIGHT: 61 IN | BODY MASS INDEX: 26.28 KG/M2 | HEART RATE: 58 BPM | RESPIRATION RATE: 16 BRPM | SYSTOLIC BLOOD PRESSURE: 103 MMHG | TEMPERATURE: 98.1 F | WEIGHT: 139.19 LBS

## 2025-08-06 DIAGNOSIS — R10.11 RIGHT UPPER QUADRANT PAIN: ICD-10-CM

## 2025-08-06 DIAGNOSIS — R14.0 ABDOMINAL DISTENSION (GASEOUS): ICD-10-CM

## 2025-08-06 DIAGNOSIS — A04.8 OTHER SPECIFIED BACTERIAL INTESTINAL INFECTIONS: ICD-10-CM

## 2025-08-06 DIAGNOSIS — R10.13 EPIGASTRIC PAIN: ICD-10-CM

## 2025-08-06 DIAGNOSIS — R10.9 UNSPECIFIED ABDOMINAL PAIN: ICD-10-CM

## 2025-08-06 DIAGNOSIS — K21.9 GASTRO-ESOPHAGEAL REFLUX DISEASE W/OUT ESOPHAGITIS: ICD-10-CM

## 2025-08-06 PROCEDURE — 99214 OFFICE O/P EST MOD 30 MIN: CPT

## 2025-08-06 RX ORDER — OMEPRAZOLE 20 MG/1
20 CAPSULE, DELAYED RELEASE ORAL
Qty: 20 | Refills: 0 | Status: ACTIVE | COMMUNITY
Start: 2025-08-06 | End: 1900-01-01

## 2025-08-06 RX ORDER — TETRACYCLINE HYDROCHLORIDE 500 MG/1
500 CAPSULE ORAL EVERY 6 HOURS
Qty: 40 | Refills: 0 | Status: ACTIVE | COMMUNITY
Start: 2025-08-06 | End: 1900-01-01

## 2025-08-06 RX ORDER — METRONIDAZOLE 500 MG/1
500 TABLET ORAL 3 TIMES DAILY
Qty: 30 | Refills: 0 | Status: ACTIVE | COMMUNITY
Start: 2025-08-06 | End: 1900-01-01

## 2025-08-08 RX ORDER — BISMUTH SUBSALICYLATE 262 MG/1
262 TABLET, CHEWABLE ORAL 4 TIMES DAILY
Qty: 80 | Refills: 0 | Status: ACTIVE | COMMUNITY
Start: 2025-08-06 | End: 1900-01-01

## 2025-08-27 ENCOUNTER — APPOINTMENT (OUTPATIENT)
Dept: INTERNAL MEDICINE | Facility: CLINIC | Age: 50
End: 2025-08-27
Payer: COMMERCIAL

## 2025-08-27 ENCOUNTER — LABORATORY RESULT (OUTPATIENT)
Age: 50
End: 2025-08-27

## 2025-08-27 VITALS
BODY MASS INDEX: 26.06 KG/M2 | WEIGHT: 138 LBS | SYSTOLIC BLOOD PRESSURE: 108 MMHG | HEART RATE: 60 BPM | OXYGEN SATURATION: 98 % | HEIGHT: 61 IN | TEMPERATURE: 97.6 F | DIASTOLIC BLOOD PRESSURE: 68 MMHG

## 2025-08-27 DIAGNOSIS — Z12.31 ENCOUNTER FOR SCREENING MAMMOGRAM FOR MALIGNANT NEOPLASM OF BREAST: ICD-10-CM

## 2025-08-27 DIAGNOSIS — Z13.228 ENCOUNTER FOR SCREENING FOR OTHER METABOLIC DISORDERS: ICD-10-CM

## 2025-08-27 DIAGNOSIS — Z00.00 ENCOUNTER FOR GENERAL ADULT MEDICAL EXAMINATION W/OUT ABNORMAL FINDINGS: ICD-10-CM

## 2025-08-27 DIAGNOSIS — A04.8 OTHER SPECIFIED BACTERIAL INTESTINAL INFECTIONS: ICD-10-CM

## 2025-08-27 PROCEDURE — 99386 PREV VISIT NEW AGE 40-64: CPT | Mod: 25

## 2025-08-29 LAB
25(OH)D3 SERPL-MCNC: 55.1 NG/ML
ALBUMIN SERPL ELPH-MCNC: 4.6 G/DL
ALP BLD-CCNC: 53 U/L
ALT SERPL-CCNC: 17 U/L
ANION GAP SERPL CALC-SCNC: 14 MMOL/L
APPEARANCE: ABNORMAL
AST SERPL-CCNC: 20 U/L
BASOPHILS # BLD AUTO: 0.05 K/UL
BASOPHILS NFR BLD AUTO: 0.6 %
BILIRUB SERPL-MCNC: 0.6 MG/DL
BILIRUBIN URINE: NEGATIVE
BLOOD URINE: ABNORMAL
BUN SERPL-MCNC: 8 MG/DL
CALCIUM SERPL-MCNC: 9.4 MG/DL
CHLORIDE SERPL-SCNC: 103 MMOL/L
CHOLEST SERPL-MCNC: 252 MG/DL
CO2 SERPL-SCNC: 24 MMOL/L
COLOR: YELLOW
CREAT SERPL-MCNC: 0.68 MG/DL
EGFRCR SERPLBLD CKD-EPI 2021: 105 ML/MIN/1.73M2
EOSINOPHIL # BLD AUTO: 0.08 K/UL
EOSINOPHIL NFR BLD AUTO: 0.9 %
ESTIMATED AVERAGE GLUCOSE: 103 MG/DL
FERRITIN SERPL-MCNC: 177 NG/ML
FOLATE SERPL-MCNC: 16.8 NG/ML
GLUCOSE QUALITATIVE U: NEGATIVE MG/DL
GLUCOSE SERPL-MCNC: 91 MG/DL
HBA1C MFR BLD HPLC: 5.2 %
HCT VFR BLD CALC: 41.6 %
HDLC SERPL-MCNC: 87 MG/DL
HGB BLD-MCNC: 13.8 G/DL
IMM GRANULOCYTES NFR BLD AUTO: 0.2 %
IRON SATN MFR SERPL: 19 %
IRON SERPL-MCNC: 46 UG/DL
KETONES URINE: ABNORMAL MG/DL
LDLC SERPL-MCNC: 153 MG/DL
LEUKOCYTE ESTERASE URINE: NEGATIVE
LYMPHOCYTES # BLD AUTO: 2.89 K/UL
LYMPHOCYTES NFR BLD AUTO: 34.3 %
MAGNESIUM SERPL-MCNC: 1.9 MG/DL
MAN DIFF?: NORMAL
MCHC RBC-ENTMCNC: 30.3 PG
MCHC RBC-ENTMCNC: 33.2 G/DL
MCV RBC AUTO: 91.4 FL
MONOCYTES # BLD AUTO: 0.4 K/UL
MONOCYTES NFR BLD AUTO: 4.7 %
NEUTROPHILS # BLD AUTO: 4.99 K/UL
NEUTROPHILS NFR BLD AUTO: 59.3 %
NITRITE URINE: NEGATIVE
NONHDLC SERPL-MCNC: 165 MG/DL
PH URINE: 6
PLATELET # BLD AUTO: 272 K/UL
POTASSIUM SERPL-SCNC: 4.5 MMOL/L
PROT SERPL-MCNC: 6.8 G/DL
PROTEIN URINE: NEGATIVE MG/DL
RBC # BLD: 4.55 M/UL
RBC # FLD: 13.1 %
SODIUM SERPL-SCNC: 141 MMOL/L
SPECIFIC GRAVITY URINE: 1.02
TIBC SERPL-MCNC: 244 UG/DL
TRIGL SERPL-MCNC: 77 MG/DL
TSH SERPL-ACNC: 2.38 UIU/ML
UIBC SERPL-MCNC: 198 UG/DL
UROBILINOGEN URINE: 0.2 MG/DL
VIT B12 SERPL-MCNC: >2000 PG/ML
WBC # FLD AUTO: 8.43 K/UL

## 2025-09-09 ENCOUNTER — RX RENEWAL (OUTPATIENT)
Age: 50
End: 2025-09-09